# Patient Record
Sex: MALE | Race: WHITE | Employment: OTHER | URBAN - METROPOLITAN AREA
[De-identification: names, ages, dates, MRNs, and addresses within clinical notes are randomized per-mention and may not be internally consistent; named-entity substitution may affect disease eponyms.]

---

## 2017-09-25 ENCOUNTER — ANESTHESIA (OUTPATIENT)
Dept: ENDOSCOPY | Age: 77
End: 2017-09-25
Payer: MEDICARE

## 2017-09-25 ENCOUNTER — HOSPITAL ENCOUNTER (OUTPATIENT)
Age: 77
Setting detail: OUTPATIENT SURGERY
Discharge: HOME | End: 2017-09-25
Attending: SURGERY | Admitting: SURGERY
Payer: MEDICARE

## 2017-09-25 ENCOUNTER — ANESTHESIA EVENT (OUTPATIENT)
Dept: ENDOSCOPY | Age: 77
End: 2017-09-25
Payer: MEDICARE

## 2017-09-25 VITALS
HEART RATE: 68 BPM | WEIGHT: 165 LBS | OXYGEN SATURATION: 97 % | RESPIRATION RATE: 12 BRPM | DIASTOLIC BLOOD PRESSURE: 68 MMHG | BODY MASS INDEX: 25.01 KG/M2 | HEIGHT: 68 IN | SYSTOLIC BLOOD PRESSURE: 117 MMHG | TEMPERATURE: 98.6 F

## 2017-09-25 LAB — GLUCOSE BLD STRIP.AUTO-MCNC: 128 MG/DL (ref 65–100)

## 2017-09-25 PROCEDURE — 77030013991 HC SNR POLYP ENDOSC BSC -A: Performed by: SURGERY

## 2017-09-25 PROCEDURE — 74011250636 HC RX REV CODE- 250/636

## 2017-09-25 PROCEDURE — 74011000250 HC RX REV CODE- 250

## 2017-09-25 PROCEDURE — 88305 TISSUE EXAM BY PATHOLOGIST: CPT | Performed by: SURGERY

## 2017-09-25 PROCEDURE — 76040000025: Performed by: SURGERY

## 2017-09-25 PROCEDURE — 74011250636 HC RX REV CODE- 250/636: Performed by: ANESTHESIOLOGY

## 2017-09-25 PROCEDURE — 82962 GLUCOSE BLOOD TEST: CPT

## 2017-09-25 PROCEDURE — 77030011640 HC PAD GRND REM COVD -A: Performed by: SURGERY

## 2017-09-25 PROCEDURE — 76060000032 HC ANESTHESIA 0.5 TO 1 HR: Performed by: SURGERY

## 2017-09-25 RX ORDER — PROPOFOL 10 MG/ML
INJECTION, EMULSION INTRAVENOUS AS NEEDED
Status: DISCONTINUED | OUTPATIENT
Start: 2017-09-25 | End: 2017-09-25 | Stop reason: HOSPADM

## 2017-09-25 RX ORDER — SODIUM CHLORIDE, SODIUM LACTATE, POTASSIUM CHLORIDE, CALCIUM CHLORIDE 600; 310; 30; 20 MG/100ML; MG/100ML; MG/100ML; MG/100ML
100 INJECTION, SOLUTION INTRAVENOUS CONTINUOUS
Status: DISCONTINUED | OUTPATIENT
Start: 2017-09-25 | End: 2017-09-25 | Stop reason: HOSPADM

## 2017-09-25 RX ORDER — SODIUM CHLORIDE, SODIUM LACTATE, POTASSIUM CHLORIDE, CALCIUM CHLORIDE 600; 310; 30; 20 MG/100ML; MG/100ML; MG/100ML; MG/100ML
100 INJECTION, SOLUTION INTRAVENOUS CONTINUOUS
Status: CANCELLED | OUTPATIENT
Start: 2017-09-25

## 2017-09-25 RX ORDER — LIDOCAINE HYDROCHLORIDE 20 MG/ML
INJECTION, SOLUTION EPIDURAL; INFILTRATION; INTRACAUDAL; PERINEURAL AS NEEDED
Status: DISCONTINUED | OUTPATIENT
Start: 2017-09-25 | End: 2017-09-25 | Stop reason: HOSPADM

## 2017-09-25 RX ORDER — PROPOFOL 10 MG/ML
INJECTION, EMULSION INTRAVENOUS
Status: DISCONTINUED | OUTPATIENT
Start: 2017-09-25 | End: 2017-09-25 | Stop reason: HOSPADM

## 2017-09-25 RX ORDER — SODIUM CHLORIDE 0.9 % (FLUSH) 0.9 %
5-10 SYRINGE (ML) INJECTION AS NEEDED
Status: CANCELLED | OUTPATIENT
Start: 2017-09-25

## 2017-09-25 RX ADMIN — PROPOFOL 140 MCG/KG/MIN: 10 INJECTION, EMULSION INTRAVENOUS at 11:45

## 2017-09-25 RX ADMIN — PROPOFOL 40 MG: 10 INJECTION, EMULSION INTRAVENOUS at 11:45

## 2017-09-25 RX ADMIN — PROPOFOL 20 MG: 10 INJECTION, EMULSION INTRAVENOUS at 11:56

## 2017-09-25 RX ADMIN — SODIUM CHLORIDE, SODIUM LACTATE, POTASSIUM CHLORIDE, AND CALCIUM CHLORIDE 100 ML/HR: 600; 310; 30; 20 INJECTION, SOLUTION INTRAVENOUS at 11:05

## 2017-09-25 RX ADMIN — LIDOCAINE HYDROCHLORIDE 50 MG: 20 INJECTION, SOLUTION EPIDURAL; INFILTRATION; INTRACAUDAL; PERINEURAL at 11:45

## 2017-09-25 RX ADMIN — LIDOCAINE HYDROCHLORIDE 10 MG: 20 INJECTION, SOLUTION EPIDURAL; INFILTRATION; INTRACAUDAL; PERINEURAL at 11:47

## 2017-09-25 NOTE — ANESTHESIA PREPROCEDURE EVALUATION
Anesthetic History               Review of Systems / Medical History  Patient summary reviewed, nursing notes reviewed and pertinent labs reviewed    Pulmonary        Sleep apnea: No treatment           Neuro/Psych              Cardiovascular                  Exercise tolerance: >4 METS     GI/Hepatic/Renal         Renal disease: CRI       Endo/Other    Diabetes: well controlled, type 2    Arthritis     Other Findings            Physical Exam    Airway  Mallampati: II  TM Distance: > 6 cm  Neck ROM: decreased range of motion   Mouth opening: Normal     Cardiovascular  Regular rate and rhythm,  S1 and S2 normal,  no murmur, click, rub, or gallop             Dental  No notable dental hx       Pulmonary  Breath sounds clear to auscultation               Abdominal  GI exam deferred       Other Findings            Anesthetic Plan    ASA: 3  Anesthesia type: total IV anesthesia            Anesthetic plan and risks discussed with: Patient

## 2017-09-25 NOTE — PROGRESS NOTES
Discharge instructions and discharge med list given to pt's friend Navi Daniels, voiced understanding.

## 2017-09-25 NOTE — ANESTHESIA POSTPROCEDURE EVALUATION
Post-Anesthesia Evaluation and Assessment    Patient: Elaine Gonzalez MRN: [id number]  SSN: [id number]    YOB: 1940  Age: 64 y.o. Sex: male       Cardiovascular Function/Vital Signs  Visit Vitals    /57    Pulse (!) 51    Temp 37 °C (98.6 °F)    Resp 30    Ht 5' 8\" (1.727 m)    Wt 74.8 kg (165 lb)    SpO2 95%    BMI 25.09 kg/m2       Patient is status post total IV anesthesia anesthesia for Procedure(s):  COLONOSCOPY / BMI 25  ENDOSCOPIC POLYPECTOMY. Nausea/Vomiting: None    Postoperative hydration reviewed and adequate. Pain:  Pain Scale 1: Numeric (0 - 10) (09/25/17 1221)  Pain Intensity 1: 0 (09/25/17 1221)   Managed    Neurological Status: At baseline    Mental Status and Level of Consciousness: Arousable    Pulmonary Status:   O2 Device: Nasal cannula (09/25/17 1221)   Adequate oxygenation and airway patent    Complications related to anesthesia: None    Post-anesthesia assessment completed.  No concerns    Signed By: Aliza Harper MD     September 25, 2017

## 2017-09-25 NOTE — PROCEDURES
Procedure in Detail:  Informed consent was obtained for the procedure. The patient was placed in the left lateral decubitus position and sedation was induced by anesthesia. The [id number] was inserted into the rectum and advanced under direct vision to the cecum, which was identified by the ileocecal valve and appendiceal orifice. The quality of the colonic preparation was good. A careful inspection was made as the colonoscope was withdrawn, including a retroflexed view of the rectum; findings and interventions are described below. Appropriate photodocumentation was obtained. Findings:   Rectum:     - Protruding lesions:     -Internal Hemorrhoids  Sigmoid:     - Excavated lesions:     - Diverticulosis  Descending Colon:     - Excavated lesions:     - Diverticulosis  Transverse Colon:     - Excavated lesions:     - Diverticulosis  Ascending Colon:     - Excavated lesions:     - Diverticulosis  Cecum:     - Protruding lesions:     -Sessile Polyp(s) size 10 mm removed by polypectomy (snare cautery)            Specimens: Specimens were collected and sent to pathology. Complications: None; patient tolerated the procedure well. \    EBL - none    Recommendations:   - Await pathology.      - no further surveillance based on age     Signed By: Teetee Anderson MD                        September 25, 2017

## 2017-09-25 NOTE — H&P
Colonoscopy History and Physical      Patient: Chriss Ignacia    Physician: Osbaldo Ohara MD    Referring Physician: Ange Dunn MD    Chief Complaint: For colonoscopy    History of Present Illness: Pt presents for colonoscopy. Has had at least 2 prior exams Nathalia Lopes); believes he had a benign polyp on last exam which is at least 8 years ago. History:  Past Medical History:   Diagnosis Date    Arthritis     hx of Reiters syndrome    Diabetes ([address] type 2 since 2001    oral agents.  bs: 110-130. hypo at 90    Elevated PSA     Generalized anxiety disorder     Gross hematuria     Impacted cerumen     Impotence of organic origin     Other malaise and fatigue 7/9/2015    Pain in joint, ankle and foot     Primary hypercoagulable state ([address]     Psychiatric disorder     minimal anxiety and depression     Pure hypercholesterolemia 7/9/2015    Shingles     Sleep apnea     just DX - no C PAP yet     Straining on urination      Past Surgical History:   Procedure Laterality Date    HX CATARACT REMOVAL  2013    bilateral with iol    HX CATARACT REMOVAL      HX HERNIA REPAIR  bilateral    HX OTHER SURGICAL Left 1980's    shoulder    HX OTHER SURGICAL Right 1986    knee    HX RETINAL DETACHMENT REPAIR Left       Social History     Social History    Marital status:      Spouse name: N/A    Number of children: N/A    Years of education: N/A     Social History Main Topics    Smoking status: Former Smoker     Packs/day: 2.00     Years: 20.00    Smokeless tobacco: Never Used      Comment: quit 1976    Alcohol use 3.6 oz/week     6 Glasses of wine per week    Drug use: No    Sexual activity: Not Asked     Other Topics Concern    None     Social History Narrative      Family History   Problem Relation Age of Onset    Cancer Mother     Dementia Mother     Psychiatric Disorder Mother     Cancer Sister      breast    Hypertension Father        Medications:   Prior to Admission medications    Medication Sig Start Date End Date Taking? Authorizing Provider   multivitamin (ONE A DAY) tablet Take 1 Tab by mouth daily. Stop seven days prior to surgery per anesthesia protocol. Yes Historical Provider   metFORMIN (GLUCOPHAGE) 1,000 mg tablet TAKE 1 TAB BY MOUTH TWO (2) TIMES DAILY (WITH MEALS). 8/31/17  Yes Amanda Mcelroy MD   lisinopril (PRINIVIL, ZESTRIL) 2.5 mg tablet Take 1 Tab by mouth daily. 8/23/17  Yes Amanda Mcelroy MD   simvastatin (ZOCOR) 40 mg tablet Take 1 Tab by mouth nightly. Patient taking differently: Take 40 mg by mouth nightly. Indications: hypercholesterolemia 6/30/17  Yes Amanda Mcelroy MD   finasteride (PROSCAR) 5 mg tablet TAKE 1 TAB BY MOUTH DAILY. Patient taking differently: TAKE 1 TAB BY MOUTH DAILY. QHS 1/9/17  Yes Yuan Messina MD   clonazePAM (KLONOPIN) 1 mg tablet Take 1 Tab by mouth daily. Max Daily Amount: 1 mg. Patient taking differently: Take 1 mg by mouth as needed. Take day of surgery per anesthesia protocol. 8/23/17   Amanda Mcelroy MD   glipiZIDE SR (GLUCOTROL XL) 5 mg CR tablet Take 1 Tab by mouth daily. 6/30/17   Amanda Mcelroy MD       Allergies: Allergies   Allergen Reactions    Penicillin G Rash     As a child       Physical Exam:     Vital Signs:   Visit Vitals    /66    Pulse 83    Temp 97.5 °F (36.4 °C)    Resp 16    Ht 5' 8\" (1.727 m)    Wt 165 lb (74.8 kg)    SpO2 98%    BMI 25.09 kg/m2     . General: in NAD      Heart: regular   Lungs: unlabored   Abdominal: soft   Neurological: grossly normal        Findings/Diagnosis: Screening for colorectal cancer     Plan of Care/Planned Procedure: Colonoscopy. Risks of endoscopy include  bleeding, perforation. They understand and agree to proceed.       Signed:  Alejandro Castle MD   9/25/2017

## 2017-09-25 NOTE — IP AVS SNAPSHOT
Shante Gabbs 
 
 
 [facility] [address] [facility] 
[phone] Patient: Alejandra Barajas MRN: [id number] OCV:2/79/6865 You are allergic to the following Allergen Reactions Penicillin G Rash As a child Recent Documentation Height Weight BMI Smoking Status 1.727 m 74.8 kg 25.09 kg/m2 Former Smoker Emergency Contacts Name Discharge Info Relation Home Work Mobile Michael Early [[phone] Lino Kunz  Son [22] [phone] About your hospitalization You were admitted on:  September 25, 2017 You last received care in the:  [facility] You were discharged on:  September 25, 2017 Unit phone number:  [phone] Why you were hospitalized Your primary diagnosis was:  Not on File Providers Seen During Your Hospitalizations Provider Role Specialty Primary office phone Jasper Ferrera MD Attending Provider General Surgery [phone] Your Primary Care Physician (PCP) Primary Care Physician Office Phone Office Fax Via [address] [phone] Follow-up Information None Current Discharge Medication List  
  
ASK your doctor about these medications Dose & Instructions Dispensing Information Comments Morning Noon Evening Bedtime  
 clonazePAM 1 mg tablet Commonly known as:  Howie Gibjon Your last dose was: Your next dose is:    
   
   
 Dose:  1 mg Take 1 Tab by mouth daily. Max Daily Amount: 1 mg. Quantity:  90 Tab Refills:  1  
     
   
   
   
  
 finasteride 5 mg tablet Commonly known as:  PROSCAR Your last dose was: Your next dose is: TAKE 1 TAB BY MOUTH DAILY. Quantity:  90 Tab Refills:  3  
     
   
   
   
  
 glipiZIDE SR 5 mg CR tablet Commonly known as:  GLUCOTROL XL  
   
 Your last dose was: Your next dose is:    
   
   
 Dose:  5 mg Take 1 Tab by mouth daily. Quantity:  90 Tab Refills:  1  
     
   
   
   
  
 lisinopril 2.5 mg tablet Commonly known as:  Pecola Cypher Your last dose was: Your next dose is:    
   
   
 Dose:  2.5 mg Take 1 Tab by mouth daily. Quantity:  90 Tab Refills:  1  
     
   
   
   
  
 metFORMIN 1,000 mg tablet Commonly known as:  GLUCOPHAGE Your last dose was: Your next dose is: TAKE 1 TAB BY MOUTH TWO (2) TIMES DAILY (WITH MEALS). Quantity:  180 Tab Refills:  1  
     
   
   
   
  
 multivitamin tablet Commonly known as:  ONE A DAY Your last dose was: Your next dose is:    
   
   
 Dose:  1 Tab Take 1 Tab by mouth daily. Stop seven days prior to surgery per anesthesia protocol. Refills:  0  
     
   
   
   
  
 simvastatin 40 mg tablet Commonly known as:  ZOCOR Your last dose was: Your next dose is:    
   
   
 Dose:  40 mg Take 1 Tab by mouth nightly. Quantity:  90 Tab Refills:  1 Discharge Instructions Gastrointestinal Colonoscopy/Flexible Sigmoidoscopy - Lower Exam Discharge Instructions 1. Call Dr. Dusty Torres at [phone] for any problems or questions. 2. Contact the doctors office for follow up appointment as directed 3. Medication may cause drowsiness for several hours, therefore, do not drive or operate machinery for remainder of the day. 4. No alcohol today. 5. Ordinarily, you may resume regular diet and activity after exam unless otherwise specified by your physician. 6. Because of air put into your colon during exam, you may experience some abdominal distension, relieved by the passage of gas, for several hours. 7. Contact your physician if you have any of the following: 
a.  Excessive amount of bleeding  large amount when having a bowel movement. Occasional specks of blood with bowel movement would not be unusual. 
b. Severe abdominal pain 
c. Fever or Chills 8. Polyp Removal  follow these additional instructions 
a. Resume regular diet  
b. Take Metamucil  1 tablespoon in juice every morning for 3 days 
c. No Aspirin, Advil, Aleve, Nuprin, Ibuprofen, or medications that contain these drugs for 2 weeks. Any additional instructions:   
 
Await pathology. Instructions given to Umair Womack and other family members. Instructions given by:  Candi Kessler RN Discharge Orders None ACO Transitions of Care Introducing [employer] [address] offers a voluntary care coordination program to provide high quality service and care to [facility] fee-for-service beneficiaries. [place] was designed to help you enhance your health and well-being through the following services: ? Transitions of Care  support for individuals who are transitioning from one care setting to another (example: Hospital to home). ? Chronic and Complex Care Coordination  support for individuals and caregivers of those with serious or chronic illnesses or with more than one chronic (ongoing) condition and those who take a number of different medications. If you meet specific medical criteria, a [address] [place] [address] Rd may call you directly to coordinate your care with your primary care physician and your other care providers. For questions about the [facility] programs, please, contact your physicians office. For general questions or additional information about Accountable Care Organizations: 
Please visit www.medicare.gov/acos. html or call 1-800-MEDICARE (9-950.804.7923) TTY users should call 8-437.945.8798. Introducing [facility]!    
 Martina Salazar introduces [employer] patient portal. Now you can access parts of your medical record, email your doctor's office, and request medication refills online. 1. In your internet browser, go to [email or web address] 2. Click on the First Time User? Click Here link in the Sign In box. You will see the New Member Sign Up page. 3. Enter your UpTap Access Code exactly as it appears below. You will not need to use this code after youve completed the sign-up process. If you do not sign up before the expiration date, you must request a new code. · UpTap Access Code: [email or web address] Expires: 11/21/2017 12:27 PM 
 
4. Enter the last four digits of your Social Security Number (xxxx) and Date of Birth (mm/dd/yyyy) as indicated and click Submit. You will be taken to the next sign-up page. 5. Create a UpTap ID. This will be your UpTap login ID and cannot be changed, so think of one that is secure and easy to remember. 6. Create a UpTap password. You can change your password at any time. 7. Enter your Password Reset Question and Answer. This can be used at a later time if you forget your password. 8. Enter your e-mail address. You will receive e-mail notification when new information is available in [address] 9. Click Sign Up. You can now view and download portions of your medical record. 10. Click the Download Summary menu link to download a portable copy of your medical information. If you have questions, please visit the Frequently Asked Questions section of the UpTap website. Remember, UpTap is NOT to be used for urgent needs. For medical emergencies, dial 911. Now available from your iPhone and Android! General Information Please provide this summary of care documentation to your next provider. Patient Signature:  ____________________________________________________________ Date:  ____________________________________________________________  
  
Shellie Spare  Provider Signature: ____________________________________________________________ Date:  ____________________________________________________________

## 2017-09-25 NOTE — DISCHARGE INSTRUCTIONS
Gastrointestinal Colonoscopy/Flexible Sigmoidoscopy - Lower Exam Discharge Instructions  1. Call Dr. Morena Nino at [phone] for any problems or questions. 2. Contact the doctors office for follow up appointment as directed  3. Medication may cause drowsiness for several hours, therefore, do not drive or operate machinery for remainder of the day. 4. No alcohol today. 5. Ordinarily, you may resume regular diet and activity after exam unless otherwise specified by your physician. 6. Because of air put into your colon during exam, you may experience some abdominal distension, relieved by the passage of gas, for several hours. 7. Contact your physician if you have any of the following:  a. Excessive amount of bleeding - large amount when having a bowel movement. Occasional specks of blood with bowel movement would not be unusual.  b. Severe abdominal pain  c. Fever or Chills  8. Polyp Removal - follow these additional instructions  a. Resume regular diet   b. Take Metamucil - 1 tablespoon in juice every morning for 3 days  c. No Aspirin, Advil, Aleve, Nuprin, Ibuprofen, or medications that contain these drugs for 2 weeks. Any additional instructions:      Await pathology. Instructions given to Christine Pearl and other family members.   Instructions given by:  Aubrey Cortes RN

## 2017-10-26 PROBLEM — G47.34 NOCTURNAL HYPOXEMIA: Status: ACTIVE | Noted: 2017-10-26

## 2017-10-26 PROBLEM — G47.33 OSA (OBSTRUCTIVE SLEEP APNEA): Status: ACTIVE | Noted: 2017-10-26

## 2018-03-16 ENCOUNTER — HOSPITAL ENCOUNTER (OUTPATIENT)
Dept: GENERAL RADIOLOGY | Age: 78
Discharge: HOME | End: 2018-03-16
Attending: FAMILY MEDICINE
Payer: MEDICARE

## 2018-03-16 DIAGNOSIS — R05.9 COUGH: ICD-10-CM

## 2018-03-16 PROBLEM — E11.21 TYPE 2 DIABETES WITH NEPHROPATHY (HCC): Status: ACTIVE | Noted: 2018-03-16

## 2018-03-16 PROCEDURE — 71046 X-RAY EXAM CHEST 2 VIEWS: CPT

## 2018-03-16 NOTE — PROGRESS NOTES
CXR negative great news! Will have blood test back Monday but continue on omnicef, medrol dose pack and all supportive care.   AW

## 2021-01-01 ENCOUNTER — APPOINTMENT (OUTPATIENT)
Dept: CT IMAGING | Age: 81
DRG: 177 | End: 2021-01-01
Attending: INTERNAL MEDICINE
Payer: MEDICARE

## 2021-01-01 ENCOUNTER — HOSPITAL ENCOUNTER (EMERGENCY)
Age: 81
Discharge: ANOTHER HOSPITAL | DRG: 177 | End: 2021-01-06
Attending: STUDENT IN AN ORGANIZED HEALTH CARE EDUCATION/TRAINING PROGRAM | Admitting: STUDENT IN AN ORGANIZED HEALTH CARE EDUCATION/TRAINING PROGRAM
Payer: MEDICARE

## 2021-01-01 ENCOUNTER — APPOINTMENT (OUTPATIENT)
Dept: GENERAL RADIOLOGY | Age: 81
DRG: 177 | End: 2021-01-01
Attending: STUDENT IN AN ORGANIZED HEALTH CARE EDUCATION/TRAINING PROGRAM
Payer: MEDICARE

## 2021-01-01 ENCOUNTER — APPOINTMENT (OUTPATIENT)
Dept: GENERAL RADIOLOGY | Age: 81
DRG: 177 | End: 2021-01-01
Attending: INTERNAL MEDICINE
Payer: MEDICARE

## 2021-01-01 ENCOUNTER — HOSPITAL ENCOUNTER (INPATIENT)
Age: 81
LOS: 5 days | DRG: 177 | End: 2021-01-11
Attending: INTERNAL MEDICINE | Admitting: INTERNAL MEDICINE
Payer: MEDICARE

## 2021-01-01 VITALS
RESPIRATION RATE: 20 BRPM | BODY MASS INDEX: 23.73 KG/M2 | SYSTOLIC BLOOD PRESSURE: 135 MMHG | DIASTOLIC BLOOD PRESSURE: 61 MMHG | OXYGEN SATURATION: 91 % | WEIGHT: 156.1 LBS | HEART RATE: 109 BPM | TEMPERATURE: 99 F

## 2021-01-01 VITALS
OXYGEN SATURATION: 96 % | HEART RATE: 99 BPM | SYSTOLIC BLOOD PRESSURE: 139 MMHG | RESPIRATION RATE: 20 BRPM | DIASTOLIC BLOOD PRESSURE: 68 MMHG | TEMPERATURE: 98.2 F

## 2021-01-01 DIAGNOSIS — Z20.822 SUSPECTED COVID-19 VIRUS INFECTION: ICD-10-CM

## 2021-01-01 DIAGNOSIS — E11.21 TYPE 2 DIABETES WITH NEPHROPATHY (HCC): ICD-10-CM

## 2021-01-01 DIAGNOSIS — G47.33 OSA (OBSTRUCTIVE SLEEP APNEA): ICD-10-CM

## 2021-01-01 DIAGNOSIS — U07.1 PNEUMONIA DUE TO COVID-19 VIRUS: ICD-10-CM

## 2021-01-01 DIAGNOSIS — R09.02 HYPOXIA: Primary | ICD-10-CM

## 2021-01-01 DIAGNOSIS — J96.01 ACUTE RESPIRATORY FAILURE WITH HYPOXIA (HCC): ICD-10-CM

## 2021-01-01 DIAGNOSIS — J12.82 PNEUMONIA DUE TO COVID-19 VIRUS: ICD-10-CM

## 2021-01-01 LAB
ABO + RH BLD: NORMAL
ALBUMIN SERPL-MCNC: 2.9 G/DL (ref 3.2–4.6)
ALBUMIN SERPL-MCNC: 3 G/DL (ref 3.2–4.6)
ALBUMIN SERPL-MCNC: 3 G/DL (ref 3.2–4.6)
ALBUMIN/GLOB SERPL: 0.7 {RATIO} (ref 1.2–3.5)
ALBUMIN/GLOB SERPL: 0.7 {RATIO} (ref 1.2–3.5)
ALBUMIN/GLOB SERPL: 0.8 {RATIO} (ref 1.2–3.5)
ALP SERPL-CCNC: 57 U/L (ref 50–136)
ALP SERPL-CCNC: 60 U/L (ref 50–136)
ALP SERPL-CCNC: 72 U/L (ref 50–136)
ALT SERPL-CCNC: 108 U/L (ref 12–65)
ALT SERPL-CCNC: 41 U/L (ref 12–65)
ALT SERPL-CCNC: 46 U/L (ref 12–65)
ANION GAP SERPL CALC-SCNC: 10 MMOL/L (ref 7–16)
ANION GAP SERPL CALC-SCNC: 10 MMOL/L (ref 7–16)
ANION GAP SERPL CALC-SCNC: 8 MMOL/L (ref 7–16)
ANION GAP SERPL CALC-SCNC: 9 MMOL/L (ref 7–16)
ARTERIAL PATENCY WRIST A: YES
AST SERPL-CCNC: 35 U/L (ref 15–37)
AST SERPL-CCNC: 39 U/L (ref 15–37)
AST SERPL-CCNC: 91 U/L (ref 15–37)
ATRIAL RATE: 95 BPM
BASE DEFICIT BLD-SCNC: 4 MMOL/L
BASOPHILS # BLD: 0 K/UL (ref 0–0.2)
BASOPHILS NFR BLD: 0 % (ref 0–2)
BDY SITE: ABNORMAL
BILIRUB SERPL-MCNC: 0.6 MG/DL (ref 0.2–1.1)
BILIRUB SERPL-MCNC: 0.6 MG/DL (ref 0.2–1.1)
BILIRUB SERPL-MCNC: 1 MG/DL (ref 0.2–1.1)
BLD PROD TYP BPU: NORMAL
BLOOD BANK CMNT PATIENT-IMP: NORMAL
BPU ID: NORMAL
BUN SERPL-MCNC: 31 MG/DL (ref 8–23)
BUN SERPL-MCNC: 34 MG/DL (ref 8–23)
BUN SERPL-MCNC: 34 MG/DL (ref 8–23)
BUN SERPL-MCNC: 42 MG/DL (ref 8–23)
CALCIUM SERPL-MCNC: 8.8 MG/DL (ref 8.3–10.4)
CALCIUM SERPL-MCNC: 8.9 MG/DL (ref 8.3–10.4)
CALCIUM SERPL-MCNC: 8.9 MG/DL (ref 8.3–10.4)
CALCIUM SERPL-MCNC: 9.1 MG/DL (ref 8.3–10.4)
CALCULATED P AXIS, ECG09: 59 DEGREES
CALCULATED R AXIS, ECG10: 4 DEGREES
CALCULATED T AXIS, ECG11: 27 DEGREES
CHLORIDE SERPL-SCNC: 100 MMOL/L (ref 98–107)
CHLORIDE SERPL-SCNC: 101 MMOL/L (ref 98–107)
CHLORIDE SERPL-SCNC: 107 MMOL/L (ref 98–107)
CHLORIDE SERPL-SCNC: 99 MMOL/L (ref 98–107)
CO2 BLD-SCNC: 19 MMOL/L
CO2 SERPL-SCNC: 20 MMOL/L (ref 21–32)
CO2 SERPL-SCNC: 23 MMOL/L (ref 21–32)
CO2 SERPL-SCNC: 23 MMOL/L (ref 21–32)
CO2 SERPL-SCNC: 25 MMOL/L (ref 21–32)
COLLECT TIME,HTIME: 1040
COVID-19 RAPID TEST, COVR: DETECTED
CREAT SERPL-MCNC: 1.11 MG/DL (ref 0.8–1.5)
CREAT SERPL-MCNC: 1.15 MG/DL (ref 0.8–1.5)
CREAT SERPL-MCNC: 1.28 MG/DL (ref 0.8–1.5)
CREAT SERPL-MCNC: 1.3 MG/DL (ref 0.8–1.5)
D DIMER PPP FEU-MCNC: 3.19 UG/ML(FEU)
DIAGNOSIS, 93000: NORMAL
DIFFERENTIAL METHOD BLD: ABNORMAL
EOSINOPHIL # BLD: 0 K/UL (ref 0–0.8)
EOSINOPHIL # BLD: 0.3 K/UL (ref 0–0.8)
EOSINOPHIL NFR BLD: 0 % (ref 0.5–7.8)
EOSINOPHIL NFR BLD: 3 % (ref 0.5–7.8)
ERYTHROCYTE [DISTWIDTH] IN BLOOD BY AUTOMATED COUNT: 13 % (ref 11.9–14.6)
ERYTHROCYTE [DISTWIDTH] IN BLOOD BY AUTOMATED COUNT: 13.1 % (ref 11.9–14.6)
ERYTHROCYTE [DISTWIDTH] IN BLOOD BY AUTOMATED COUNT: 13.2 % (ref 11.9–14.6)
ERYTHROCYTE [DISTWIDTH] IN BLOOD BY AUTOMATED COUNT: 13.2 % (ref 11.9–14.6)
FLOW RATE ISTAT,IFRATE: 55 L/MIN
GAS FLOW.O2 O2 DELIVERY SYS: ABNORMAL L/MIN
GLOBULIN SER CALC-MCNC: 4 G/DL (ref 2.3–3.5)
GLOBULIN SER CALC-MCNC: 4 G/DL (ref 2.3–3.5)
GLOBULIN SER CALC-MCNC: 4.2 G/DL (ref 2.3–3.5)
GLUCOSE BLD STRIP.AUTO-MCNC: 171 MG/DL (ref 65–100)
GLUCOSE BLD STRIP.AUTO-MCNC: 173 MG/DL (ref 65–100)
GLUCOSE BLD STRIP.AUTO-MCNC: 192 MG/DL (ref 65–100)
GLUCOSE BLD STRIP.AUTO-MCNC: 200 MG/DL (ref 65–100)
GLUCOSE BLD STRIP.AUTO-MCNC: 201 MG/DL (ref 65–100)
GLUCOSE BLD STRIP.AUTO-MCNC: 218 MG/DL (ref 65–100)
GLUCOSE BLD STRIP.AUTO-MCNC: 231 MG/DL (ref 65–100)
GLUCOSE BLD STRIP.AUTO-MCNC: 232 MG/DL (ref 65–100)
GLUCOSE BLD STRIP.AUTO-MCNC: 249 MG/DL (ref 65–100)
GLUCOSE BLD STRIP.AUTO-MCNC: 249 MG/DL (ref 65–100)
GLUCOSE BLD STRIP.AUTO-MCNC: 268 MG/DL (ref 65–100)
GLUCOSE BLD STRIP.AUTO-MCNC: 281 MG/DL (ref 65–100)
GLUCOSE BLD STRIP.AUTO-MCNC: 283 MG/DL (ref 65–100)
GLUCOSE BLD STRIP.AUTO-MCNC: 294 MG/DL (ref 65–100)
GLUCOSE BLD STRIP.AUTO-MCNC: 302 MG/DL (ref 65–100)
GLUCOSE BLD STRIP.AUTO-MCNC: 308 MG/DL (ref 65–100)
GLUCOSE BLD STRIP.AUTO-MCNC: 332 MG/DL (ref 65–100)
GLUCOSE BLD STRIP.AUTO-MCNC: 369 MG/DL (ref 65–100)
GLUCOSE SERPL-MCNC: 162 MG/DL (ref 65–100)
GLUCOSE SERPL-MCNC: 170 MG/DL (ref 65–100)
GLUCOSE SERPL-MCNC: 257 MG/DL (ref 65–100)
GLUCOSE SERPL-MCNC: 263 MG/DL (ref 65–100)
HCO3 BLD-SCNC: 18.7 MMOL/L (ref 22–26)
HCT VFR BLD AUTO: 36.4 % (ref 41.1–50.3)
HCT VFR BLD AUTO: 38.9 % (ref 41.1–50.3)
HCT VFR BLD AUTO: 40.9 % (ref 41.1–50.3)
HCT VFR BLD AUTO: 43.3 % (ref 41.1–50.3)
HGB BLD-MCNC: 12.6 G/DL (ref 13.6–17.2)
HGB BLD-MCNC: 13.3 G/DL (ref 13.6–17.2)
HGB BLD-MCNC: 13.9 G/DL (ref 13.6–17.2)
HGB BLD-MCNC: 14.7 G/DL (ref 13.6–17.2)
IMM GRANULOCYTES # BLD AUTO: 0 K/UL (ref 0–0.5)
IMM GRANULOCYTES # BLD AUTO: 0.1 K/UL (ref 0–0.5)
IMM GRANULOCYTES NFR BLD AUTO: 0 % (ref 0–5)
IMM GRANULOCYTES NFR BLD AUTO: 1 % (ref 0–5)
INR PPP: 1.1
LDH SERPL L TO P-CCNC: 250 U/L (ref 110–210)
LYMPHOCYTES # BLD: 0.4 K/UL (ref 0.5–4.6)
LYMPHOCYTES # BLD: 0.5 K/UL (ref 0.5–4.6)
LYMPHOCYTES # BLD: 0.5 K/UL (ref 0.5–4.6)
LYMPHOCYTES # BLD: 0.8 K/UL (ref 0.5–4.6)
LYMPHOCYTES NFR BLD: 4 % (ref 13–44)
LYMPHOCYTES NFR BLD: 5 % (ref 13–44)
LYMPHOCYTES NFR BLD: 6 % (ref 13–44)
LYMPHOCYTES NFR BLD: 8 % (ref 13–44)
MCH RBC QN AUTO: 31.1 PG (ref 26.1–32.9)
MCH RBC QN AUTO: 31.2 PG (ref 26.1–32.9)
MCH RBC QN AUTO: 31.4 PG (ref 26.1–32.9)
MCH RBC QN AUTO: 31.5 PG (ref 26.1–32.9)
MCHC RBC AUTO-ENTMCNC: 33.9 G/DL (ref 31.4–35)
MCHC RBC AUTO-ENTMCNC: 34 G/DL (ref 31.4–35)
MCHC RBC AUTO-ENTMCNC: 34.2 G/DL (ref 31.4–35)
MCHC RBC AUTO-ENTMCNC: 34.6 G/DL (ref 31.4–35)
MCV RBC AUTO: 90.9 FL (ref 79.6–97.8)
MCV RBC AUTO: 91 FL (ref 79.6–97.8)
MCV RBC AUTO: 91.9 FL (ref 79.6–97.8)
MCV RBC AUTO: 92.3 FL (ref 79.6–97.8)
MM INDURATION POC: 0 MM (ref 0–5)
MONOCYTES # BLD: 0.8 K/UL (ref 0.1–1.3)
MONOCYTES # BLD: 0.9 K/UL (ref 0.1–1.3)
MONOCYTES NFR BLD: 10 % (ref 4–12)
MONOCYTES NFR BLD: 9 % (ref 4–12)
NEUTS SEG # BLD: 7.6 K/UL (ref 1.7–8.2)
NEUTS SEG # BLD: 7.7 K/UL (ref 1.7–8.2)
NEUTS SEG # BLD: 7.9 K/UL (ref 1.7–8.2)
NEUTS SEG # BLD: 8 K/UL (ref 1.7–8.2)
NEUTS SEG NFR BLD: 81 % (ref 43–78)
NEUTS SEG NFR BLD: 84 % (ref 43–78)
NEUTS SEG NFR BLD: 84 % (ref 43–78)
NEUTS SEG NFR BLD: 85 % (ref 43–78)
NRBC # BLD: 0 K/UL (ref 0–0.2)
O2/TOTAL GAS SETTING VFR VENT: 100 %
P-R INTERVAL, ECG05: 186 MS
PCO2 BLD: 25.6 MMHG (ref 35–45)
PH BLD: 7.47 [PH] (ref 7.35–7.45)
PLATELET # BLD AUTO: 210 K/UL (ref 150–450)
PLATELET # BLD AUTO: 234 K/UL (ref 150–450)
PLATELET # BLD AUTO: 244 K/UL (ref 150–450)
PLATELET # BLD AUTO: 258 K/UL (ref 150–450)
PLATELET COMMENTS,PCOM: ABNORMAL
PMV BLD AUTO: 10 FL (ref 9.4–12.3)
PMV BLD AUTO: 10 FL (ref 9.4–12.3)
PMV BLD AUTO: 9.2 FL (ref 9.4–12.3)
PMV BLD AUTO: 9.6 FL (ref 9.4–12.3)
PO2 BLD: 54 MMHG (ref 75–100)
POTASSIUM SERPL-SCNC: 3.7 MMOL/L (ref 3.5–5.1)
POTASSIUM SERPL-SCNC: 3.9 MMOL/L (ref 3.5–5.1)
POTASSIUM SERPL-SCNC: 4.3 MMOL/L (ref 3.5–5.1)
POTASSIUM SERPL-SCNC: 4.6 MMOL/L (ref 3.5–5.1)
PPD POC: NEGATIVE NEGATIVE
PROT SERPL-MCNC: 6.9 G/DL (ref 6.3–8.2)
PROT SERPL-MCNC: 7 G/DL (ref 6.3–8.2)
PROT SERPL-MCNC: 7.2 G/DL (ref 6.3–8.2)
PROTHROMBIN TIME: 14.3 SEC (ref 12.5–14.7)
Q-T INTERVAL, ECG07: 320 MS
QRS DURATION, ECG06: 84 MS
QTC CALCULATION (BEZET), ECG08: 402 MS
RBC # BLD AUTO: 4 M/UL (ref 4.23–5.6)
RBC # BLD AUTO: 4.28 M/UL (ref 4.23–5.6)
RBC # BLD AUTO: 4.43 M/UL (ref 4.23–5.6)
RBC # BLD AUTO: 4.71 M/UL (ref 4.23–5.6)
RBC MORPH BLD: ABNORMAL
SAO2 % BLD: 90 % (ref 95–98)
SERVICE CMNT-IMP: ABNORMAL
SODIUM SERPL-SCNC: 131 MMOL/L (ref 136–145)
SODIUM SERPL-SCNC: 133 MMOL/L (ref 136–145)
SODIUM SERPL-SCNC: 134 MMOL/L (ref 138–145)
SODIUM SERPL-SCNC: 137 MMOL/L (ref 136–145)
SOURCE, COVRS: ABNORMAL
SPECIMEN TYPE: ABNORMAL
STATUS OF UNIT,%ST: NORMAL
TROPONIN-HIGH SENSITIVITY: 5.4 PG/ML (ref 0–14)
UNIT DIVISION, %UDIV: 0
VENTRICULAR RATE, ECG03: 95 BPM
WBC # BLD AUTO: 9.1 K/UL (ref 4.3–11.1)
WBC # BLD AUTO: 9.3 K/UL (ref 4.3–11.1)
WBC # BLD AUTO: 9.4 K/UL (ref 4.3–11.1)
WBC # BLD AUTO: 9.5 K/UL (ref 4.3–11.1)
WBC MORPH BLD: ABNORMAL

## 2021-01-01 PROCEDURE — 74011000302 HC RX REV CODE- 302: Performed by: INTERNAL MEDICINE

## 2021-01-01 PROCEDURE — 82803 BLOOD GASES ANY COMBINATION: CPT

## 2021-01-01 PROCEDURE — 93005 ELECTROCARDIOGRAM TRACING: CPT | Performed by: STUDENT IN AN ORGANIZED HEALTH CARE EDUCATION/TRAINING PROGRAM

## 2021-01-01 PROCEDURE — 74011636637 HC RX REV CODE- 636/637: Performed by: INTERNAL MEDICINE

## 2021-01-01 PROCEDURE — 74011250636 HC RX REV CODE- 250/636: Performed by: INTERNAL MEDICINE

## 2021-01-01 PROCEDURE — 65270000029 HC RM PRIVATE

## 2021-01-01 PROCEDURE — 74011000258 HC RX REV CODE- 258: Performed by: INTERNAL MEDICINE

## 2021-01-01 PROCEDURE — 74011250637 HC RX REV CODE- 250/637: Performed by: INTERNAL MEDICINE

## 2021-01-01 PROCEDURE — 94762 N-INVAS EAR/PLS OXIMTRY CONT: CPT

## 2021-01-01 PROCEDURE — 84484 ASSAY OF TROPONIN QUANT: CPT

## 2021-01-01 PROCEDURE — 85379 FIBRIN DEGRADATION QUANT: CPT

## 2021-01-01 PROCEDURE — 36430 TRANSFUSION BLD/BLD COMPNT: CPT

## 2021-01-01 PROCEDURE — 77030040830 HC CATH URETH FOL MDII -A

## 2021-01-01 PROCEDURE — 74011000636 HC RX REV CODE- 636: Performed by: INTERNAL MEDICINE

## 2021-01-01 PROCEDURE — 74011636637 HC RX REV CODE- 636/637: Performed by: HOSPITALIST

## 2021-01-01 PROCEDURE — 82962 GLUCOSE BLOOD TEST: CPT

## 2021-01-01 PROCEDURE — 86580 TB INTRADERMAL TEST: CPT | Performed by: INTERNAL MEDICINE

## 2021-01-01 PROCEDURE — 2709999900 HC NON-CHARGEABLE SUPPLY

## 2021-01-01 PROCEDURE — 96374 THER/PROPH/DIAG INJ IV PUSH: CPT

## 2021-01-01 PROCEDURE — 99285 EMERGENCY DEPT VISIT HI MDM: CPT

## 2021-01-01 PROCEDURE — 97535 SELF CARE MNGMENT TRAINING: CPT

## 2021-01-01 PROCEDURE — 77030021668 HC NEB PREFIL KT VYRM -A

## 2021-01-01 PROCEDURE — 85610 PROTHROMBIN TIME: CPT

## 2021-01-01 PROCEDURE — 74011250636 HC RX REV CODE- 250/636: Performed by: NURSE PRACTITIONER

## 2021-01-01 PROCEDURE — 74011000250 HC RX REV CODE- 250: Performed by: INTERNAL MEDICINE

## 2021-01-01 PROCEDURE — 36600 WITHDRAWAL OF ARTERIAL BLOOD: CPT

## 2021-01-01 PROCEDURE — 36415 COLL VENOUS BLD VENIPUNCTURE: CPT

## 2021-01-01 PROCEDURE — 80048 BASIC METABOLIC PNL TOTAL CA: CPT

## 2021-01-01 PROCEDURE — XW033E5 INTRODUCTION OF REMDESIVIR ANTI-INFECTIVE INTO PERIPHERAL VEIN, PERCUTANEOUS APPROACH, NEW TECHNOLOGY GROUP 5: ICD-10-PCS | Performed by: INTERNAL MEDICINE

## 2021-01-01 PROCEDURE — 71045 X-RAY EXAM CHEST 1 VIEW: CPT

## 2021-01-01 PROCEDURE — 80053 COMPREHEN METABOLIC PANEL: CPT

## 2021-01-01 PROCEDURE — 83615 LACTATE (LD) (LDH) ENZYME: CPT

## 2021-01-01 PROCEDURE — 87635 SARS-COV-2 COVID-19 AMP PRB: CPT

## 2021-01-01 PROCEDURE — 99223 1ST HOSP IP/OBS HIGH 75: CPT | Performed by: INTERNAL MEDICINE

## 2021-01-01 PROCEDURE — 85025 COMPLETE CBC W/AUTO DIFF WBC: CPT

## 2021-01-01 PROCEDURE — 77030012793 HC CIRC VNTLTR FISP -B

## 2021-01-01 PROCEDURE — 97161 PT EVAL LOW COMPLEX 20 MIN: CPT

## 2021-01-01 PROCEDURE — 97110 THERAPEUTIC EXERCISES: CPT

## 2021-01-01 PROCEDURE — XW13325 TRANSFUSION OF CONVALESCENT PLASMA (NONAUTOLOGOUS) INTO PERIPHERAL VEIN, PERCUTANEOUS APPROACH, NEW TECHNOLOGY GROUP 5: ICD-10-PCS | Performed by: INTERNAL MEDICINE

## 2021-01-01 PROCEDURE — 71260 CT THORAX DX C+: CPT

## 2021-01-01 PROCEDURE — 77030012794 HC KT NSL CANN/HGH TRAN -A

## 2021-01-01 PROCEDURE — 97166 OT EVAL MOD COMPLEX 45 MIN: CPT

## 2021-01-01 PROCEDURE — 74011250636 HC RX REV CODE- 250/636: Performed by: STUDENT IN AN ORGANIZED HEALTH CARE EDUCATION/TRAINING PROGRAM

## 2021-01-01 PROCEDURE — 86901 BLOOD TYPING SEROLOGIC RH(D): CPT

## 2021-01-01 PROCEDURE — 74011250637 HC RX REV CODE- 250/637: Performed by: HOSPITALIST

## 2021-01-01 RX ORDER — QUETIAPINE FUMARATE 25 MG/1
25 TABLET, FILM COATED ORAL
Status: DISCONTINUED | OUTPATIENT
Start: 2021-01-01 | End: 2021-01-01

## 2021-01-01 RX ORDER — DEXAMETHASONE SODIUM PHOSPHATE 100 MG/10ML
10 INJECTION INTRAMUSCULAR; INTRAVENOUS
Status: COMPLETED | OUTPATIENT
Start: 2021-01-01 | End: 2021-01-01

## 2021-01-01 RX ORDER — LEVOTHYROXINE SODIUM 50 UG/1
50 TABLET ORAL
Status: DISCONTINUED | OUTPATIENT
Start: 2021-01-01 | End: 2021-01-01

## 2021-01-01 RX ORDER — SODIUM CHLORIDE 0.9 % (FLUSH) 0.9 %
5-40 SYRINGE (ML) INJECTION AS NEEDED
Status: DISCONTINUED | OUTPATIENT
Start: 2021-01-01 | End: 2021-01-01 | Stop reason: HOSPADM

## 2021-01-01 RX ORDER — DEXAMETHASONE 4 MG/1
6 TABLET ORAL DAILY
Status: DISCONTINUED | OUTPATIENT
Start: 2021-01-01 | End: 2021-01-01

## 2021-01-01 RX ORDER — UREA 10 %
220 LOTION (ML) TOPICAL DAILY
Status: DISCONTINUED | OUTPATIENT
Start: 2021-01-01 | End: 2021-01-01

## 2021-01-01 RX ORDER — ONDANSETRON 2 MG/ML
4 INJECTION INTRAMUSCULAR; INTRAVENOUS
Status: DISCONTINUED | OUTPATIENT
Start: 2021-01-01 | End: 2021-01-01 | Stop reason: HOSPADM

## 2021-01-01 RX ORDER — INSULIN LISPRO 100 [IU]/ML
0-10 INJECTION, SOLUTION INTRAVENOUS; SUBCUTANEOUS
Status: DISCONTINUED | OUTPATIENT
Start: 2021-01-01 | End: 2021-01-01

## 2021-01-01 RX ORDER — SODIUM CHLORIDE 0.9 % (FLUSH) 0.9 %
5-40 SYRINGE (ML) INJECTION EVERY 8 HOURS
Status: DISCONTINUED | OUTPATIENT
Start: 2021-01-01 | End: 2021-01-01

## 2021-01-01 RX ORDER — INSULIN GLARGINE 100 [IU]/ML
14 INJECTION, SOLUTION SUBCUTANEOUS DAILY
Status: DISCONTINUED | OUTPATIENT
Start: 2021-01-01 | End: 2021-01-01

## 2021-01-01 RX ORDER — CLONAZEPAM 0.5 MG/1
0.5 TABLET ORAL DAILY PRN
Status: DISCONTINUED | OUTPATIENT
Start: 2021-01-01 | End: 2021-01-01 | Stop reason: HOSPADM

## 2021-01-01 RX ORDER — GUAIFENESIN/DEXTROMETHORPHAN 100-10MG/5
5 SYRUP ORAL
Status: DISCONTINUED | OUTPATIENT
Start: 2021-01-01 | End: 2021-01-01 | Stop reason: HOSPADM

## 2021-01-01 RX ORDER — LORAZEPAM 2 MG/ML
2 INJECTION INTRAMUSCULAR
Status: DISCONTINUED | OUTPATIENT
Start: 2021-01-01 | End: 2021-01-01

## 2021-01-01 RX ORDER — SODIUM CHLORIDE 9 MG/ML
250 INJECTION, SOLUTION INTRAVENOUS AS NEEDED
Status: DISCONTINUED | OUTPATIENT
Start: 2021-01-01 | End: 2021-01-01 | Stop reason: HOSPADM

## 2021-01-01 RX ORDER — INSULIN GLARGINE 100 [IU]/ML
18 INJECTION, SOLUTION SUBCUTANEOUS DAILY
Status: DISCONTINUED | OUTPATIENT
Start: 2021-01-01 | End: 2021-01-01

## 2021-01-01 RX ORDER — MORPHINE SULFATE 2 MG/ML
1 INJECTION, SOLUTION INTRAMUSCULAR; INTRAVENOUS
Status: DISCONTINUED | OUTPATIENT
Start: 2021-01-01 | End: 2021-01-01

## 2021-01-01 RX ORDER — POLYETHYLENE GLYCOL 3350 17 G/17G
17 POWDER, FOR SOLUTION ORAL DAILY PRN
Status: DISCONTINUED | OUTPATIENT
Start: 2021-01-01 | End: 2021-01-01 | Stop reason: HOSPADM

## 2021-01-01 RX ORDER — FAMOTIDINE 20 MG/1
20 TABLET, FILM COATED ORAL 2 TIMES DAILY
Status: DISCONTINUED | OUTPATIENT
Start: 2021-01-01 | End: 2021-01-01

## 2021-01-01 RX ORDER — ATORVASTATIN CALCIUM 10 MG/1
10 TABLET, FILM COATED ORAL
Status: DISCONTINUED | OUTPATIENT
Start: 2021-01-01 | End: 2021-01-01

## 2021-01-01 RX ORDER — ACETAMINOPHEN 650 MG/1
650 SUPPOSITORY RECTAL
Status: DISCONTINUED | OUTPATIENT
Start: 2021-01-01 | End: 2021-01-01 | Stop reason: HOSPADM

## 2021-01-01 RX ORDER — MORPHINE SULFATE 2 MG/ML
2 INJECTION, SOLUTION INTRAMUSCULAR; INTRAVENOUS
Status: DISCONTINUED | OUTPATIENT
Start: 2021-01-01 | End: 2021-01-01 | Stop reason: HOSPADM

## 2021-01-01 RX ORDER — QUETIAPINE FUMARATE 25 MG/1
25 TABLET, FILM COATED ORAL ONCE
Status: COMPLETED | OUTPATIENT
Start: 2021-01-01 | End: 2021-01-01

## 2021-01-01 RX ORDER — ACETAMINOPHEN 325 MG/1
650 TABLET ORAL
Status: DISCONTINUED | OUTPATIENT
Start: 2021-01-01 | End: 2021-01-01 | Stop reason: HOSPADM

## 2021-01-01 RX ORDER — MELATONIN
2000 DAILY
Status: DISCONTINUED | OUTPATIENT
Start: 2021-01-17 | End: 2021-01-01

## 2021-01-01 RX ORDER — LISINOPRIL 5 MG/1
2.5 TABLET ORAL DAILY
Status: DISCONTINUED | OUTPATIENT
Start: 2021-01-01 | End: 2021-01-01 | Stop reason: HOSPADM

## 2021-01-01 RX ORDER — INSULIN LISPRO 100 [IU]/ML
5 INJECTION, SOLUTION INTRAVENOUS; SUBCUTANEOUS ONCE
Status: COMPLETED | OUTPATIENT
Start: 2021-01-01 | End: 2021-01-01

## 2021-01-01 RX ORDER — ENOXAPARIN SODIUM 100 MG/ML
30 INJECTION SUBCUTANEOUS EVERY 12 HOURS
Status: DISCONTINUED | OUTPATIENT
Start: 2021-01-01 | End: 2021-01-01

## 2021-01-01 RX ORDER — FINASTERIDE 5 MG/1
5 TABLET, FILM COATED ORAL DAILY
Status: DISCONTINUED | OUTPATIENT
Start: 2021-01-01 | End: 2021-01-01

## 2021-01-01 RX ORDER — HALOPERIDOL 5 MG/ML
2 INJECTION INTRAMUSCULAR
Status: DISCONTINUED | OUTPATIENT
Start: 2021-01-01 | End: 2021-01-01 | Stop reason: HOSPADM

## 2021-01-01 RX ORDER — AZITHROMYCIN 250 MG/1
500 TABLET, FILM COATED ORAL EVERY 24 HOURS
Status: DISCONTINUED | OUTPATIENT
Start: 2021-01-01 | End: 2021-01-01 | Stop reason: HOSPADM

## 2021-01-01 RX ORDER — ALBUTEROL SULFATE 90 UG/1
2 AEROSOL, METERED RESPIRATORY (INHALATION)
Status: DISCONTINUED | OUTPATIENT
Start: 2021-01-01 | End: 2021-01-01

## 2021-01-01 RX ORDER — MELATONIN
6000 DAILY
Status: DISCONTINUED | OUTPATIENT
Start: 2021-01-01 | End: 2021-01-01

## 2021-01-01 RX ORDER — DEXAMETHASONE SODIUM PHOSPHATE 100 MG/10ML
6 INJECTION INTRAMUSCULAR; INTRAVENOUS EVERY 8 HOURS
Status: DISCONTINUED | OUTPATIENT
Start: 2021-01-01 | End: 2021-01-01

## 2021-01-01 RX ORDER — ASCORBIC ACID 500 MG
500 TABLET ORAL 3 TIMES DAILY
Status: DISCONTINUED | OUTPATIENT
Start: 2021-01-01 | End: 2021-01-01

## 2021-01-01 RX ORDER — SODIUM CHLORIDE 0.9 % (FLUSH) 0.9 %
10 SYRINGE (ML) INJECTION
Status: COMPLETED | OUTPATIENT
Start: 2021-01-01 | End: 2021-01-01

## 2021-01-01 RX ORDER — TAMSULOSIN HYDROCHLORIDE 0.4 MG/1
0.4 CAPSULE ORAL DAILY
Status: DISCONTINUED | OUTPATIENT
Start: 2021-01-01 | End: 2021-01-01

## 2021-01-01 RX ORDER — ASPIRIN 81 MG/1
81 TABLET ORAL DAILY
Status: DISCONTINUED | OUTPATIENT
Start: 2021-01-01 | End: 2021-01-01

## 2021-01-01 RX ORDER — FUROSEMIDE 10 MG/ML
20 INJECTION INTRAMUSCULAR; INTRAVENOUS ONCE
Status: COMPLETED | OUTPATIENT
Start: 2021-01-01 | End: 2021-01-01

## 2021-01-01 RX ORDER — SODIUM CHLORIDE 9 MG/ML
30 INJECTION, SOLUTION INTRAVENOUS EVERY 24 HOURS
Status: DISCONTINUED | OUTPATIENT
Start: 2021-01-01 | End: 2021-01-01

## 2021-01-01 RX ADMIN — Medication 220 MG: at 21:56

## 2021-01-01 RX ADMIN — FAMOTIDINE 20 MG: 20 TABLET, FILM COATED ORAL at 17:07

## 2021-01-01 RX ADMIN — MORPHINE SULFATE 1 MG: 2 INJECTION, SOLUTION INTRAMUSCULAR; INTRAVENOUS at 22:30

## 2021-01-01 RX ADMIN — ENOXAPARIN SODIUM 30 MG: 30 INJECTION SUBCUTANEOUS at 08:42

## 2021-01-01 RX ADMIN — INSULIN LISPRO 4 UNITS: 100 INJECTION, SOLUTION INTRAVENOUS; SUBCUTANEOUS at 17:30

## 2021-01-01 RX ADMIN — SODIUM CHLORIDE 30 ML: 900 INJECTION, SOLUTION INTRAVENOUS at 09:00

## 2021-01-01 RX ADMIN — CEFTRIAXONE 2 G: 2 INJECTION, POWDER, FOR SOLUTION INTRAMUSCULAR; INTRAVENOUS at 21:38

## 2021-01-01 RX ADMIN — AZITHROMYCIN MONOHYDRATE 500 MG: 250 TABLET ORAL at 21:37

## 2021-01-01 RX ADMIN — Medication 220 MG: at 09:13

## 2021-01-01 RX ADMIN — FAMOTIDINE 20 MG: 20 TABLET, FILM COATED ORAL at 08:32

## 2021-01-01 RX ADMIN — ENOXAPARIN SODIUM 30 MG: 30 INJECTION SUBCUTANEOUS at 21:39

## 2021-01-01 RX ADMIN — TAMSULOSIN HYDROCHLORIDE 0.4 MG: 0.4 CAPSULE ORAL at 09:13

## 2021-01-01 RX ADMIN — INSULIN LISPRO 4 UNITS: 100 INJECTION, SOLUTION INTRAVENOUS; SUBCUTANEOUS at 17:32

## 2021-01-01 RX ADMIN — ENOXAPARIN SODIUM 30 MG: 30 INJECTION SUBCUTANEOUS at 22:17

## 2021-01-01 RX ADMIN — INSULIN LISPRO 6 UNITS: 100 INJECTION, SOLUTION INTRAVENOUS; SUBCUTANEOUS at 08:30

## 2021-01-01 RX ADMIN — ENOXAPARIN SODIUM 30 MG: 30 INJECTION SUBCUTANEOUS at 08:32

## 2021-01-01 RX ADMIN — CEFTRIAXONE 2 G: 2 INJECTION, POWDER, FOR SOLUTION INTRAMUSCULAR; INTRAVENOUS at 22:17

## 2021-01-01 RX ADMIN — DEXAMETHASONE SODIUM PHOSPHATE 6 MG: 10 INJECTION INTRAMUSCULAR; INTRAVENOUS at 09:36

## 2021-01-01 RX ADMIN — ATORVASTATIN CALCIUM 10 MG: 10 TABLET, FILM COATED ORAL at 21:38

## 2021-01-01 RX ADMIN — LEVOTHYROXINE SODIUM 50 MCG: 0.05 TABLET ORAL at 05:35

## 2021-01-01 RX ADMIN — Medication 220 MG: at 09:26

## 2021-01-01 RX ADMIN — INSULIN LISPRO 2 UNITS: 100 INJECTION, SOLUTION INTRAVENOUS; SUBCUTANEOUS at 08:33

## 2021-01-01 RX ADMIN — ENOXAPARIN SODIUM 30 MG: 30 INJECTION SUBCUTANEOUS at 21:55

## 2021-01-01 RX ADMIN — Medication 10 ML: at 05:36

## 2021-01-01 RX ADMIN — ENOXAPARIN SODIUM 30 MG: 30 INJECTION SUBCUTANEOUS at 09:24

## 2021-01-01 RX ADMIN — INSULIN LISPRO 6 UNITS: 100 INJECTION, SOLUTION INTRAVENOUS; SUBCUTANEOUS at 12:30

## 2021-01-01 RX ADMIN — ONDANSETRON 4 MG: 2 INJECTION INTRAMUSCULAR; INTRAVENOUS at 20:39

## 2021-01-01 RX ADMIN — Medication 10 ML: at 05:07

## 2021-01-01 RX ADMIN — INSULIN LISPRO 2 UNITS: 100 INJECTION, SOLUTION INTRAVENOUS; SUBCUTANEOUS at 13:10

## 2021-01-01 RX ADMIN — MORPHINE SULFATE 1 MG: 2 INJECTION, SOLUTION INTRAMUSCULAR; INTRAVENOUS at 05:30

## 2021-01-01 RX ADMIN — ATORVASTATIN CALCIUM 10 MG: 10 TABLET, FILM COATED ORAL at 21:39

## 2021-01-01 RX ADMIN — GUAIFENESIN AND DEXTROMETHORPHAN 5 ML: 100; 10 SYRUP ORAL at 00:36

## 2021-01-01 RX ADMIN — GUAIFENESIN AND DEXTROMETHORPHAN 5 ML: 100; 10 SYRUP ORAL at 18:29

## 2021-01-01 RX ADMIN — LORAZEPAM 2 MG: 2 INJECTION INTRAMUSCULAR; INTRAVENOUS at 01:37

## 2021-01-01 RX ADMIN — Medication 10 ML: at 05:15

## 2021-01-01 RX ADMIN — AZITHROMYCIN MONOHYDRATE 500 MG: 250 TABLET ORAL at 21:56

## 2021-01-01 RX ADMIN — INSULIN GLARGINE 14 UNITS: 100 INJECTION, SOLUTION SUBCUTANEOUS at 08:33

## 2021-01-01 RX ADMIN — LISINOPRIL 2.5 MG: 5 TABLET ORAL at 08:41

## 2021-01-01 RX ADMIN — DEXAMETHASONE 6 MG: 4 TABLET ORAL at 08:32

## 2021-01-01 RX ADMIN — Medication 10 ML: at 22:12

## 2021-01-01 RX ADMIN — INSULIN LISPRO 8 UNITS: 100 INJECTION, SOLUTION INTRAVENOUS; SUBCUTANEOUS at 22:17

## 2021-01-01 RX ADMIN — Medication 220 MG: at 08:32

## 2021-01-01 RX ADMIN — LEVOTHYROXINE SODIUM 50 MCG: 0.05 TABLET ORAL at 05:06

## 2021-01-01 RX ADMIN — FINASTERIDE 5 MG: 5 TABLET, FILM COATED ORAL at 09:13

## 2021-01-01 RX ADMIN — INSULIN LISPRO 10 UNITS: 100 INJECTION, SOLUTION INTRAVENOUS; SUBCUTANEOUS at 21:39

## 2021-01-01 RX ADMIN — Medication 10 ML: at 14:26

## 2021-01-01 RX ADMIN — MORPHINE SULFATE 2 MG: 2 INJECTION, SOLUTION INTRAMUSCULAR; INTRAVENOUS at 14:33

## 2021-01-01 RX ADMIN — ASPIRIN 81 MG: 81 TABLET ORAL at 09:13

## 2021-01-01 RX ADMIN — TAMSULOSIN HYDROCHLORIDE 0.4 MG: 0.4 CAPSULE ORAL at 08:32

## 2021-01-01 RX ADMIN — LORAZEPAM 2 MG: 2 INJECTION INTRAMUSCULAR; INTRAVENOUS at 21:57

## 2021-01-01 RX ADMIN — INSULIN LISPRO 4 UNITS: 100 INJECTION, SOLUTION INTRAVENOUS; SUBCUTANEOUS at 09:00

## 2021-01-01 RX ADMIN — Medication 10 ML: at 21:40

## 2021-01-01 RX ADMIN — OXYCODONE HYDROCHLORIDE AND ACETAMINOPHEN 500 MG: 500 TABLET ORAL at 21:38

## 2021-01-01 RX ADMIN — INSULIN LISPRO 8 UNITS: 100 INJECTION, SOLUTION INTRAVENOUS; SUBCUTANEOUS at 21:55

## 2021-01-01 RX ADMIN — INSULIN LISPRO 5 UNITS: 100 INJECTION, SOLUTION INTRAVENOUS; SUBCUTANEOUS at 21:40

## 2021-01-01 RX ADMIN — Medication 10 ML: at 11:38

## 2021-01-01 RX ADMIN — ATORVASTATIN CALCIUM 10 MG: 10 TABLET, FILM COATED ORAL at 21:56

## 2021-01-01 RX ADMIN — INSULIN GLARGINE 18 UNITS: 100 INJECTION, SOLUTION SUBCUTANEOUS at 09:35

## 2021-01-01 RX ADMIN — FAMOTIDINE 20 MG: 20 TABLET, FILM COATED ORAL at 21:56

## 2021-01-01 RX ADMIN — FINASTERIDE 5 MG: 5 TABLET, FILM COATED ORAL at 08:32

## 2021-01-01 RX ADMIN — ENOXAPARIN SODIUM 30 MG: 30 INJECTION SUBCUTANEOUS at 09:13

## 2021-01-01 RX ADMIN — VITAMIN D, TAB 1000IU (100/BT) 6 TABLET: 25 TAB at 09:26

## 2021-01-01 RX ADMIN — FINASTERIDE 5 MG: 5 TABLET, FILM COATED ORAL at 08:41

## 2021-01-01 RX ADMIN — AZITHROMYCIN MONOHYDRATE 500 MG: 250 TABLET ORAL at 22:16

## 2021-01-01 RX ADMIN — DEXAMETHASONE SODIUM PHOSPHATE 6 MG: 10 INJECTION INTRAMUSCULAR; INTRAVENOUS at 14:11

## 2021-01-01 RX ADMIN — Medication 10 ML: at 22:24

## 2021-01-01 RX ADMIN — CEFTRIAXONE 2 G: 2 INJECTION, POWDER, FOR SOLUTION INTRAMUSCULAR; INTRAVENOUS at 21:37

## 2021-01-01 RX ADMIN — DEXAMETHASONE 6 MG: 4 TABLET ORAL at 09:13

## 2021-01-01 RX ADMIN — GUAIFENESIN AND DEXTROMETHORPHAN 5 ML: 100; 10 SYRUP ORAL at 16:32

## 2021-01-01 RX ADMIN — TAMSULOSIN HYDROCHLORIDE 0.4 MG: 0.4 CAPSULE ORAL at 08:41

## 2021-01-01 RX ADMIN — ATORVASTATIN CALCIUM 10 MG: 10 TABLET, FILM COATED ORAL at 22:16

## 2021-01-01 RX ADMIN — TAMSULOSIN HYDROCHLORIDE 0.4 MG: 0.4 CAPSULE ORAL at 09:25

## 2021-01-01 RX ADMIN — DEXAMETHASONE 6 MG: 4 TABLET ORAL at 08:41

## 2021-01-01 RX ADMIN — Medication 10 ML: at 14:12

## 2021-01-01 RX ADMIN — INSULIN LISPRO 4 UNITS: 100 INJECTION, SOLUTION INTRAVENOUS; SUBCUTANEOUS at 11:23

## 2021-01-01 RX ADMIN — OXYCODONE HYDROCHLORIDE AND ACETAMINOPHEN 500 MG: 500 TABLET ORAL at 17:32

## 2021-01-01 RX ADMIN — OXYCODONE HYDROCHLORIDE AND ACETAMINOPHEN 500 MG: 500 TABLET ORAL at 21:56

## 2021-01-01 RX ADMIN — Medication 10 ML: at 05:30

## 2021-01-01 RX ADMIN — TUBERCULIN PURIFIED PROTEIN DERIVATIVE 5 UNITS: 5 INJECTION, SOLUTION INTRADERMAL at 22:07

## 2021-01-01 RX ADMIN — INSULIN LISPRO 8 UNITS: 100 INJECTION, SOLUTION INTRAVENOUS; SUBCUTANEOUS at 21:39

## 2021-01-01 RX ADMIN — REMDESIVIR 100 MG: 100 INJECTION, POWDER, LYOPHILIZED, FOR SOLUTION INTRAVENOUS at 09:37

## 2021-01-01 RX ADMIN — SODIUM CHLORIDE 30 ML: 900 INJECTION, SOLUTION INTRAVENOUS at 09:57

## 2021-01-01 RX ADMIN — FINASTERIDE 5 MG: 5 TABLET, FILM COATED ORAL at 09:25

## 2021-01-01 RX ADMIN — AZITHROMYCIN MONOHYDRATE 500 MG: 250 TABLET ORAL at 21:38

## 2021-01-01 RX ADMIN — FAMOTIDINE 20 MG: 20 TABLET, FILM COATED ORAL at 09:25

## 2021-01-01 RX ADMIN — FAMOTIDINE 20 MG: 20 TABLET, FILM COATED ORAL at 17:32

## 2021-01-01 RX ADMIN — FAMOTIDINE 20 MG: 20 TABLET, FILM COATED ORAL at 09:13

## 2021-01-01 RX ADMIN — SODIUM CHLORIDE 500 ML: 900 INJECTION, SOLUTION INTRAVENOUS at 16:53

## 2021-01-01 RX ADMIN — ENOXAPARIN SODIUM 30 MG: 30 INJECTION SUBCUTANEOUS at 21:37

## 2021-01-01 RX ADMIN — GUAIFENESIN AND DEXTROMETHORPHAN 5 ML: 100; 10 SYRUP ORAL at 05:30

## 2021-01-01 RX ADMIN — Medication 10 ML: at 21:51

## 2021-01-01 RX ADMIN — INSULIN LISPRO 2 UNITS: 100 INJECTION, SOLUTION INTRAVENOUS; SUBCUTANEOUS at 12:15

## 2021-01-01 RX ADMIN — LEVOTHYROXINE SODIUM 50 MCG: 0.05 TABLET ORAL at 05:15

## 2021-01-01 RX ADMIN — INSULIN LISPRO 6 UNITS: 100 INJECTION, SOLUTION INTRAVENOUS; SUBCUTANEOUS at 16:48

## 2021-01-01 RX ADMIN — REMDESIVIR 100 MG: 100 INJECTION, POWDER, LYOPHILIZED, FOR SOLUTION INTRAVENOUS at 09:44

## 2021-01-01 RX ADMIN — CEFTRIAXONE 2 G: 2 INJECTION, POWDER, FOR SOLUTION INTRAMUSCULAR; INTRAVENOUS at 21:55

## 2021-01-01 RX ADMIN — REMDESIVIR 200 MG: 100 INJECTION, POWDER, LYOPHILIZED, FOR SOLUTION INTRAVENOUS at 10:30

## 2021-01-01 RX ADMIN — INSULIN GLARGINE 14 UNITS: 100 INJECTION, SOLUTION SUBCUTANEOUS at 09:16

## 2021-01-01 RX ADMIN — FAMOTIDINE 20 MG: 20 TABLET, FILM COATED ORAL at 17:25

## 2021-01-01 RX ADMIN — QUETIAPINE FUMARATE 25 MG: 25 TABLET ORAL at 20:41

## 2021-01-01 RX ADMIN — Medication 10 ML: at 13:39

## 2021-01-01 RX ADMIN — INSULIN LISPRO 6 UNITS: 100 INJECTION, SOLUTION INTRAVENOUS; SUBCUTANEOUS at 11:38

## 2021-01-01 RX ADMIN — INSULIN LISPRO 4 UNITS: 100 INJECTION, SOLUTION INTRAVENOUS; SUBCUTANEOUS at 16:15

## 2021-01-01 RX ADMIN — OXYCODONE HYDROCHLORIDE AND ACETAMINOPHEN 500 MG: 500 TABLET ORAL at 08:41

## 2021-01-01 RX ADMIN — SODIUM CHLORIDE 100 ML: 900 INJECTION, SOLUTION INTRAVENOUS at 11:38

## 2021-01-01 RX ADMIN — OXYCODONE HYDROCHLORIDE AND ACETAMINOPHEN 500 MG: 500 TABLET ORAL at 15:56

## 2021-01-01 RX ADMIN — OXYCODONE HYDROCHLORIDE AND ACETAMINOPHEN 500 MG: 500 TABLET ORAL at 16:14

## 2021-01-01 RX ADMIN — ASPIRIN 81 MG: 81 TABLET ORAL at 08:32

## 2021-01-01 RX ADMIN — OXYCODONE HYDROCHLORIDE AND ACETAMINOPHEN 500 MG: 500 TABLET ORAL at 08:32

## 2021-01-01 RX ADMIN — OXYCODONE HYDROCHLORIDE AND ACETAMINOPHEN 500 MG: 500 TABLET ORAL at 21:39

## 2021-01-01 RX ADMIN — ASPIRIN 81 MG: 81 TABLET ORAL at 09:26

## 2021-01-01 RX ADMIN — FAMOTIDINE 20 MG: 20 TABLET, FILM COATED ORAL at 18:25

## 2021-01-01 RX ADMIN — ASPIRIN 81 MG: 81 TABLET ORAL at 08:41

## 2021-01-01 RX ADMIN — Medication 10 ML: at 13:47

## 2021-01-01 RX ADMIN — DEXAMETHASONE SODIUM PHOSPHATE 10 MG: 10 INJECTION INTRAMUSCULAR; INTRAVENOUS at 17:30

## 2021-01-01 RX ADMIN — IOPAMIDOL 100 ML: 755 INJECTION, SOLUTION INTRAVENOUS at 11:38

## 2021-01-01 RX ADMIN — FUROSEMIDE 20 MG: 10 INJECTION, SOLUTION INTRAMUSCULAR; INTRAVENOUS at 11:02

## 2021-01-01 RX ADMIN — OXYCODONE HYDROCHLORIDE AND ACETAMINOPHEN 500 MG: 500 TABLET ORAL at 09:25

## 2021-01-01 RX ADMIN — OXYCODONE HYDROCHLORIDE AND ACETAMINOPHEN 500 MG: 500 TABLET ORAL at 22:16

## 2021-01-01 RX ADMIN — INSULIN LISPRO 4 UNITS: 100 INJECTION, SOLUTION INTRAVENOUS; SUBCUTANEOUS at 09:28

## 2021-01-01 RX ADMIN — OXYCODONE HYDROCHLORIDE AND ACETAMINOPHEN 500 MG: 500 TABLET ORAL at 15:53

## 2021-01-01 RX ADMIN — ALBUTEROL SULFATE 2 PUFF: 108 INHALANT RESPIRATORY (INHALATION) at 15:31

## 2021-01-01 RX ADMIN — FAMOTIDINE 20 MG: 20 TABLET, FILM COATED ORAL at 08:41

## 2021-01-01 RX ADMIN — LEVOTHYROXINE SODIUM 50 MCG: 0.05 TABLET ORAL at 05:30

## 2021-01-01 RX ADMIN — REMDESIVIR 100 MG: 100 INJECTION, POWDER, LYOPHILIZED, FOR SOLUTION INTRAVENOUS at 09:12

## 2021-01-01 RX ADMIN — OXYCODONE HYDROCHLORIDE AND ACETAMINOPHEN 500 MG: 500 TABLET ORAL at 09:13

## 2021-01-06 PROBLEM — Z20.822 SUSPECTED COVID-19 VIRUS INFECTION: Status: ACTIVE | Noted: 2021-01-01

## 2021-01-06 PROBLEM — I10 HYPERTENSION: Chronic | Status: ACTIVE | Noted: 2021-01-01

## 2021-01-06 PROBLEM — J96.01 ACUTE RESPIRATORY FAILURE WITH HYPOXIA (HCC): Status: ACTIVE | Noted: 2021-01-01

## 2021-01-06 NOTE — H&P
Hospitalist H&P Note     Admit Date:  No admission date for patient encounter. Name:  Sally Ellington   Age:  78 y.o.  :  1940   MRN:  [id number]   PCP:  Nanda Delacruz MD  Treatment Team: Attending Provider: Kwaku Og MD    HPI:     CC:   Shortness of breath      Mr. Jimmy Fallon is a 77 yo male with PMH of HTN, DM2 evaluated with 10 days of malaise, dyspnea, anorexia. He had positive sick contact though unconfirmed as COVID, 2 weeks prior. He has progressively declined and came to the ED where he has O2 sats in 80s, improved with 5 L NC. CXR shows pulmonary infiltrates. COVID pending at time of interview. Denies fever, ear or throat pain, diarrhea, focal weakness of paresthesia     10 systems reviewed and negative except as noted in HPI. Past Medical History:   Diagnosis Date    Arthritis     hx of Reiters syndrome    Diabetes ([address] type 2 since     oral agents.  bs: 110-130. hypo at 90    Elevated PSA     Generalized anxiety disorder     Gross hematuria     Impacted cerumen     Impotence of organic origin     Other malaise and fatigue 2015    Pain in joint, ankle and foot     Primary hypercoagulable state ([address]     Psychiatric disorder     minimal anxiety and depression     Pure hypercholesterolemia 2015    Shingles     Sleep apnea     just DX - no C PAP yet     Straining on urination       Past Surgical History:   Procedure Laterality Date    COLONOSCOPY N/A 2017    COLONOSCOPY / BMI 25 performed by Dakotah Tilley MD at [address]      bilateral with iol    HX CATARACT REMOVAL      HX HERNIA REPAIR  bilateral    HX HERNIA REPAIR Right 2019    HX OTHER SURGICAL Left     shoulder    HX OTHER SURGICAL Right     knee    HX RETINAL DETACHMENT REPAIR Left     UT COLSC FLX W/RMVL OF TUMOR POLYP LESION SNARE TQ  2017           Allergies   Allergen Reactions    Penicillin G Rash     As a child Social History     Tobacco Use    Smoking status: Former Smoker     Packs/day: 2.00     Years: 20.00     Pack years: 40.00    Smokeless tobacco: Never Used    Tobacco comment: quit 1976   Substance Use Topics    Alcohol use: Yes     Alcohol/week: 6.0 standard drinks     Types: 6 Glasses of wine per week      Family History   Problem Relation Age of Onset    Cancer Mother     Dementia Mother     Psychiatric Disorder Mother     Cancer Sister         breast    Hypertension Father       Immunization History   Administered Date(s) Administered    Influenza High Dose Vaccine PF 01/29/2019    Influenza Vaccine 12/28/2015    Influenza Vaccine (Quad) PF (>6 Mo Flulaval, Fluarix, and >3 Yrs Afluria, Fluzone 14333) 12/02/2016    Influenza Vaccine (Tri) Adjuvanted (>65 Yrs FLUAD TRI 67170) 09/24/2019    Pneumococcal Conjugate (PCV-13) 06/28/2016    Tdap 08/23/2017     PTA Medications:  Cannot display prior to admission medications because the patient has not been admitted in this contact. Objective:   No data found. No intake or output data in the 24 hours ending 01/06/21 1851    Physical Exam:  General:    Alert. No distress, elderly  Eyes:   Normal sclera. Extraocular movements intact. ENT:  Normocephalic, atraumatic. CV:   RRR. No m/r/g. No edema  Lungs:  CTAB. No wheezing, rhonchi, or rales. Abdomen: Soft, nontender, nondistended. Present BS  Extremities: Warm and dry. .  Neurologic:  grossly intact. Skin:     No rashes or jaundice. Normal coloration  Psych:  Normal mood and affect. I reviewed the labs, imaging, EKGs, telemetry, and other studies done this admission.         Data Review:   Recent Results (from the past 24 hour(s))   CBC WITH AUTOMATED DIFF    Collection Time: 01/06/21  4:43 PM   Result Value Ref Range    WBC 9.4 4.3 - 11.1 K/uL    RBC 4.71 4.23 - 5.6 M/uL    HGB 14.7 13.6 - 17.2 g/dL    HCT 43.3 41.1 - 50.3 %    MCV 91.9 79.6 - 97.8 FL    MCH 31.2 26.1 - 32.9 PG MCHC 33.9 31.4 - 35.0 g/dL    RDW 13.2 11.9 - 14.6 %    PLATELET 405 050 - 456 K/uL    MPV 9.6 9.4 - 12.3 FL    ABSOLUTE NRBC 0.00 0.0 - 0.2 K/uL    NEUTROPHILS 84 (H) 43 - 78 %    LYMPHOCYTES 4 (L) 13 - 44 %    MONOCYTES 9 4.0 - 12.0 %    EOSINOPHILS 3 0.5 - 7.8 %    BASOPHILS 0 0.0 - 2.0 %    IMMATURE GRANULOCYTES 0 0.0 - 5.0 %    ABS. NEUTROPHILS 7.9 1.7 - 8.2 K/UL    ABS. LYMPHOCYTES 0.4 (L) 0.5 - 4.6 K/UL    ABS. MONOCYTES 0.8 0.1 - 1.3 K/UL    ABS. EOSINOPHILS 0.3 0.0 - 0.8 K/UL    ABS. BASOPHILS 0.0 0.0 - 0.2 K/UL    ABS. IMM. GRANS. 0.0 0.0 - 0.5 K/UL    RBC COMMENTS SLIGHT  ANISOCYTOSIS + POIKILOCYTOSIS        WBC COMMENTS Result Confirmed By Smear      PLATELET COMMENTS LARGE FORMS PRESENT     DF AUTOMATED     METABOLIC PANEL, COMPREHENSIVE    Collection Time: 01/06/21  4:43 PM   Result Value Ref Range    Sodium 131 (L) 136 - 145 mmol/L    Potassium 4.6 3.5 - 5.1 mmol/L    Chloride 99 98 - 107 mmol/L    CO2 23 21 - 32 mmol/L    Anion gap 9 7 - 16 mmol/L    Glucose 263 (H) 65 - 100 mg/dL    BUN 31 (H) 8 - 23 MG/DL    Creatinine 1.28 0.8 - 1.5 MG/DL    GFR est AA >60 >60 ml/min/1.73m2    GFR est non-AA 57 (L) >60 ml/min/1.73m2    Calcium 8.9 8.3 - 10.4 MG/DL    Bilirubin, total 1.0 0.2 - 1.1 MG/DL    ALT (SGPT) 41 12 - 65 U/L    AST (SGOT) 39 (H) 15 - 37 U/L    Alk. phosphatase 57 50 - 136 U/L    Protein, total 7.2 6.3 - 8.2 g/dL    Albumin 3.0 (L) 3.2 - 4.6 g/dL    Globulin 4.2 (H) 2.3 - 3.5 g/dL    A-G Ratio 0.7 (L) 1.2 - 3.5     TROPONIN-HIGH SENSITIVITY    Collection Time: 01/06/21  4:43 PM   Result Value Ref Range    Troponin-High Sensitivity 5.4 0 - 14 pg/mL   SARS-COV-2    Collection Time: 01/06/21  5:31 PM   Result Value Ref Range    Specimen source Nasopharyngeal      COVID-19 rapid test Detected (AA) NOTD         All Micro Results     None          Other Studies:  Xr Chest Port    Result Date: 1/6/2021  Chest X-ray INDICATION: Shortness of breath A portable AP view of the chest was obtained. FINDINGS: There are bilateral infiltrates, most prominent in the left lung base. The heart size is normal.  The bony thorax is intact.       IMPRESSION: Bilateral pulmonary infiltrates       Assessment and Plan:     Hospital Problems as of 1/6/2021 Date Reviewed: 2/25/2020          Codes Class Noted - Resolved POA    * (Principal) Acute respiratory failure with hypoxia ([facility] ICD-10-CM: J96.01  ICD-9-CM: 518.81  1/6/2021 - Present Yes        Suspected COVID-19 virus infection ICD-10-CM: Z20.822  ICD-9-CM: V01.79  1/6/2021 - Present Yes        Hypertension (Chronic) ICD-10-CM: I10  ICD-9-CM: 401.9  1/6/2021 - Present Yes        OMAR (obstructive sleep apnea) ICD-10-CM: I38.27  ICD-9-CM: 327.23  10/26/2017 - Present Yes        Diabetes mellitus type 2, controlled ([facility] ICD-10-CM: E11.9  ICD-9-CM: 250.00  6/28/2016 - Present Yes        Generalized anxiety disorder ICD-10-CM: F41.1  ICD-9-CM: 300.02  7/9/2015 - Present Yes              · Acute hypoxic respiratory failure with suspected COVID19 pneumonia:  · Admit to [place]  · Contact isolation  · Decadron D 1/10  · Rocephin and azithromycin D 1/5  · Will need to discuss plasma/ remdesivir pending COVID swab  · O2 to wean as tolerant   · Vitamin c and zinc   · Check INR, ddimer, LDH      · DM2:  · SSI      · HTN:  · continued lisinopril      · Hyponatremia:  · followup labs      · CKD:  · followup BMP    Discharge planning:    DVT ppx: lovenox BID  Code status:  Full  Estimated LOS:  Greater than 2 midnights  Risk:  high  Care plan: harvey winn   Signed:  Dalila Guardado MD

## 2021-01-06 NOTE — ED NOTES
Pt tested positive for Covid and room 634 was assigned downtown. Pt is aware and [facility] has been called for transport.

## 2021-01-06 NOTE — ED PROVIDER NOTES
Patient is 24-year-old male presents to [place] emergency department complaining of fatigue, weakness, shortness of breath and dry cough. States his symptoms have been ongoing for the past 4 to 5 days. Denies any known exposure. States he was exposed at Thu time to a family member who potentially had COVID-19, they found out after the fact. Patient denies any chest pain. Reports calling EMS given his increased fatigue, weakness and shortness of breath. EMS reports patient's O2 saturation was 85 to 88% on room air. Patient currently satting in the low 90s on 4 L via nasal cannula. Patient has no history of lung problems. Past Medical History:  
Diagnosis Date  Arthritis   
 hx of Reiters syndrome  Diabetes ([address] type 2 since 2001  
 oral agents. bs: 110-130. hypo at 90  Elevated PSA  Generalized anxiety disorder  Gross hematuria  Impacted cerumen  Impotence of organic origin  Other malaise and fatigue 7/9/2015  Pain in joint, ankle and foot  Primary hypercoagulable state ([address]  Psychiatric disorder   
 minimal anxiety and depression  Pure hypercholesterolemia 7/9/2015  Shingles  Sleep apnea   
 just DX - no C PAP yet  Straining on urination Past Surgical History:  
Procedure Laterality Date  COLONOSCOPY N/A 9/25/2017 COLONOSCOPY / BMI 25 performed by Howie Anderson MD at [address] CATARACT REMOVAL  2013  
 bilateral with iol  HX CATARACT REMOVAL    
 HX HERNIA REPAIR  bilateral  
 HX HERNIA REPAIR Right 06/12/2019  HX OTHER SURGICAL Left 1980's  
 shoulder  HX OTHER SURGICAL Right 1986  
 knee  HX RETINAL DETACHMENT REPAIR Left  [facility] COLSC FLX W/RMVL OF TUMOR POLYP LESION SNARE TQ  9/25/2017 Family History:  
Problem Relation Age of Onset  Cancer Mother  Dementia Mother  Psychiatric Disorder Mother  Cancer Sister   
     breast  
 Hypertension Father Social History Socioeconomic History  Marital status:  Spouse name: Not on file  Number of children: Not on file  Years of education: Not on file  Highest education level: Not on file Occupational History  Not on file Social Needs  Financial resource strain: Not on file  Food insecurity Worry: Not on file Inability: Not on file  Transportation needs Medical: Not on file Non-medical: Not on file Tobacco Use  Smoking status: Former Smoker Packs/day: 2.00 Years: 20.00 Pack years: 40.00  Smokeless tobacco: Never Used  Tobacco comment: quit 1976 Substance and Sexual Activity  Alcohol use: Yes Alcohol/week: 6.0 standard drinks Types: 6 Glasses of wine per week  Drug use: No  
 Sexual activity: Not on file Lifestyle  Physical activity Days per week: Not on file Minutes per session: Not on file  Stress: Not on file Relationships  Social connections Talks on phone: Not on file Gets together: Not on file Attends Caodaism service: Not on file Active member of club or organization: Not on file Attends meetings of clubs or organizations: Not on file Relationship status: Not on file  Intimate partner violence Fear of current or ex partner: Not on file Emotionally abused: Not on file Physically abused: Not on file Forced sexual activity: Not on file Other Topics Concern  Not on file Social History Narrative  Not on file ALLERGIES: Penicillin g Review of Systems Constitutional: Positive for fatigue. Negative for chills and fever. HENT: Negative for congestion, rhinorrhea, sinus pressure and sore throat. Eyes: Negative for visual disturbance. Respiratory: Positive for cough. Negative for shortness of breath. Cardiovascular: Negative for chest pain. Gastrointestinal: Negative for abdominal pain, blood in stool, diarrhea, nausea and vomiting. Endocrine: Negative for polyuria. Genitourinary: Negative for difficulty urinating and dysuria. Musculoskeletal: Negative for arthralgias, neck pain and neck stiffness. Skin: Negative for color change and rash. Neurological: Positive for weakness. Negative for syncope, speech difficulty, numbness and headaches. Psychiatric/Behavioral: Negative for behavioral problems, confusion and suicidal ideas. All other systems reviewed and are negative. There were no vitals filed for this visit. Physical Exam 
Vitals signs and nursing note reviewed. Constitutional:   
   Appearance: Normal appearance. HENT:  
   Head: Normocephalic and atraumatic. Nose: Nose normal.  
   Mouth/Throat:  
   Mouth: Mucous membranes are moist.  
Eyes:  
   Extraocular Movements: Extraocular movements intact. Neck: Musculoskeletal: Normal range of motion. Cardiovascular:  
   Rate and Rhythm: Normal rate and regular rhythm. Heart sounds: Normal heart sounds. Comments: Rate range from the high 90s to low 100s Pulmonary:  
   Effort: Pulmonary effort is normal.  
   Breath sounds: Normal breath sounds. No wheezing, rhonchi or rales. Abdominal:  
   General: Abdomen is flat. Palpations: Abdomen is soft. Tenderness: There is no abdominal tenderness. There is no guarding. Musculoskeletal: Normal range of motion. Skin: 
   General: Skin is warm and dry. Neurological:  
   General: No focal deficit present. Mental Status: He is alert and oriented to person, place, and time. Psychiatric:     
   Mood and Affect: Mood normal.  
 
  
 
MDM Number of Diagnoses or Management Options Hypoxia Diagnosis management comments: Patient was seen and examined wearing appropriate PPE.  Patient complains of shortness of breath, concern for covid 19.  Requiring 4L via NC currently.  Patient given 10 mg IV Decadron.  Patient did receive 500 cc bolus IV fluid in route with EMS.  EKG obtained shows sinus rhythm, rate 95, , QRS 84, QTc 402, normal axis, no significant ST elevation or depression.  Labs are normal white count, stable H&H, grossly normal electrolytes, BUN 31, Creatinine 1.28, GFR 57.  Patient's glucose is elevated but he denies taking his oral diabetic medications secondary to decreased p.o. intake over the last few days.  Troponin 5.4.  Chest x-ray shows bilateral pulmonary infiltrates. Covid 19 test was positive.  Patient requiring supplemental oxygen to maintain normal O2 saturation.  Thus he would benefit from medical admission.  I spoke with the hospitalist agreed to admit this patient for continued evaluation and treatment.  Patient voiced understanding and agreement.  
 
  
 
Procedures

## 2021-01-06 NOTE — ED NOTES
Pt to ER from home with EMS after he states he has had cold like symptoms for about one week but has become very weak over the past four days and has not been able to eat. Pt found to be hypoxic with EMS at 85% with walking and 88% with sitting. Pt has a mask in ER.

## 2021-01-07 NOTE — DIABETES MGMT
Patient COVID +. Patient's A1c 7.2 in August. Patient was taking glipizide and metformin at home per PTA med list. Blood glucose 263 on admission. Blood glucose ranged 263-308 yesterday with patient receiving dexamethasone 10 mg and Humalog 8 units. Blood glucose 268 this morning and 283 at lunch. Spoke with provider and received and order to start Lantus 14 units daily.

## 2021-01-07 NOTE — PROGRESS NOTES
Problem: Falls - Risk of  Goal: *Absence of Falls  Description: Document Reginald Sol Fall Risk and appropriate interventions in the flowsheet.   Outcome: Progressing Towards Goal  Note: Fall Risk Interventions:            Medication Interventions: Teach patient to arise slowly, Patient to call before getting OOB    Elimination Interventions: Patient to call for help with toileting needs, Stay With Me (per policy), Toilet paper/wipes in reach

## 2021-01-07 NOTE — PROGRESS NOTES
Updated patient on his COVID positive  Status, agrees to plasma and remdesvir that are ordered  Alison Mederos MD

## 2021-01-07 NOTE — PROGRESS NOTES
ACUTE PHYSICAL THERAPY GOALS:  (Developed with and agreed upon by patient and/or caregiver. )  LTG:  (1.)Mr. Angelito Ramey will move from supine to sit and sit to supine , scoot up and down and roll side to side in bed with INDEPENDENT within 7 treatment day(s). (2.)Mr. Angelito Ramey will transfer from bed to chair and chair to bed with INDEPENDENT using the least restrictive device within 7 treatment day(s). (3.)Mr. Angelito Ramey will ambulate with INDEPENDENT for 250+ feet with the least restrictive device within 7 treatment day(s) while maintaining normal vital signs. (4.)Mr. Angelito Ramey will participate in 23+ minutes of therapeutic activity within 7 treatment days while maintaining normal vital signs throughout.   ________________________________________________________________________________________________       PHYSICAL THERAPY ASSESSMENT: Initial Assessment and AM PT Treatment Day # 1      Darylene Speedy is a 78 y.o. male   PRIMARY DIAGNOSIS: Acute respiratory failure with hypoxia ([address]  Acute respiratory failure with hypoxia ([facility] [J96.01]       Reason for Referral:    ICD-10: Treatment Diagnosis: Difficulty in walking, Not elsewhere classified (R26.2)  [place] / [place] / Product Type: Medicare /     ASSESSMENT:     REHAB RECOMMENDATIONS:   Recommendation to date pending progress:  [address] [place] [address]  Equipment:    To Be Determined     PRIOR LEVEL OF FUNCTION:  (Prior to Hospitalization) INITIAL/CURRENT LEVEL OF FUNCTION:  (Most Recently Demonstrated)   Bed Mobility:   Independent  Sit to Stand:   Independent  Transfers:   Independent  Gait/Mobility:   Independent Bed Mobility:   Contact Guard Assistance  Sit to Stand:  [employer] Assistance  Transfers:   Contact Guard Assistance  Gait/Mobility:   Contact Guard Assistance     ASSESSMENT:  Mr. Angelito Ramey with decreased bed mobility, transfers, ambulation, balance, activity tolerance, and overall general functional mobility s/p hospital admission on 1/6/21 with weakness, hypoxic, SOB/cough, COVID +. Pt A & O x 4 this date, 5 L O2 via n.c, O2 90% at rest.  Pt reports lives alone, independent at baseline, drives, RA at home, no falls. Pt CGA to SBA for transfers, ambulated in room without assistive device x 10', O2 stats 88%. Pt seated break, cues for pursed lip breathing, energy conservation. Pt with little reserve with activity. PT to cont to follow for acute care needs to address deficits noted above. Pt would benefit from HHPT at discharge pending progress with mobility. SUBJECTIVE:   Mr. Kanwal Elkins states, \"I am feeling a little better. \"    SOCIAL HISTORY/LIVING ENVIRONMENT:   Home Environment: Private residence  # Steps to Enter: 0  One/Two Story Residence: One story  Living Alone: Yes  Support Systems: Child(romina), Family member(s), Friends \ neighbors  OBJECTIVE:     PAIN: VITAL SIGNS: LINES/DRAINS:   Pre Treatment: Pain Screen  Pain Scale 1: Numeric (0 - 10)  Pain Intensity 1: 0  Post Treatment: 0 Vital Signs  O2 Sat (%): 90 %  O2 Device: Nasal cannula  O2 Flow Rate (L/min): 5 l/min IV  O2 Device: Nasal cannula     GROSS EVALUATION:  B LE Within Functional Limits Abnormal/ Functional Abnormal/ Non-Functional (see comments) Not Tested Comments:   AROM [x] [] [] []    PROM [x] [] [] []    Strength [x] [] [] []    Balance [x] [] [] []    Posture [] [x] [] []    Sensation [] [] [] [x]    Coordination [] [x] [] []    Tone [] [] [] [x]    Edema [] [] [] [x]    Activity Tolerance [] [x] [] []     [] [] [] []      COGNITION/  PERCEPTION: Intact Impaired   (see comments) Comments:   Orientation [x] []    Vision [x] []    Hearing [x] []    Command Following [x] []    Safety Awareness [x] []     [] []      MOBILITY: I Mod I S SBA CGA Min Mod Max Total  NT x2 Comments:   Bed Mobility    Rolling [] [] [] [x] [] [] [] [] [] [] []    Supine to Sit [] [] [] [] [] [] [] [] [] [x] []    Scooting [] [] [] [x] [] [] [] [] [] [] []    Sit to Supine [] [] [] [] [x] [] [] [] [] [] []    Transfers    Sit to Stand [] [] [] [] [x] [] [] [] [] [] []    Bed to Chair [] [] [] [] [x] [] [] [] [] [] []    Stand to Sit [] [] [] [] [x] [] [] [] [] [] []    I=Independent, Mod I=Modified Independent, S=Supervision, SBA=Standby Assistance, CGA=Contact Guard Assistance,   Min=Minimal Assistance, Mod=Moderate Assistance, Max=Maximal Assistance, Total=Total Assistance, NT=Not Tested  GAIT: I Mod I S SBA CGA Min Mod Max Total  NT x2 Comments:   Level of Assistance [] [] [] [] [x] [] [] [] [] [] [] 5 L O2   Distance 10    DME None    Gait Quality Shuffled steps, slow jake    I=Independent, Mod I=Modified Independent, S=Supervision, SBA=Standby Assistance, CGA=Contact Guard Assistance,   Min=Minimal Assistance, Mod=Moderate Assistance, Max=Maximal Assistance, Total=Total Assistance, NT=Not Tested    [address] [facility] [address] [address] AM-[facility] Form       How much difficulty does the patient currently have. .. Unable A Lot A Little None   1. Turning over in bed (including adjusting bedclothes, sheets and blankets)? [] 1   [] 2   [x] 3   [] 4   2. Sitting down on and standing up from a chair with arms ( e.g., wheelchair, bedside commode, etc.)   [] 1   [] 2   [x] 3   [] 4   3. Moving from lying on back to sitting on the side of the bed? [] 1   [] 2   [x] 3   [] 4   How much help from another person does the patient currently need. .. Total A Lot A Little None   4. Moving to and from a bed to a chair (including a wheelchair)? [] 1   [] 2   [x] 3   [] 4   5. Need to walk in hospital room? [] 1   [] 2   [x] 3   [] 4   6. Climbing 3-5 steps with a railing? [] 1   [x] 2   [] 3   [] 4   © 2007, Trustees of [address] [place] [address] [address] [address], under license to [employer].  All rights reserved     Score:  Initial: 17 Most Recent: X (Date: -- )    Interpretation of Tool:  Represents activities that are increasingly more difficult (i.e. Bed mobility, Transfers, Gait). PLAN:   FREQUENCY/DURATION: PT Plan of Care: 3 times/week for duration of hospital stay or until stated goals are met, whichever comes first.    PROBLEM LIST:   (Skilled intervention is medically necessary to address:)  1. Decreased ADL/Functional Activities  2. Decreased Activity Tolerance  3. Decreased Balance  4. Decreased Coordination  5. Decreased Gait Ability  6. Decreased Strength  7. Decreased Transfer Abilities   INTERVENTIONS PLANNED:   (Benefits and precautions of physical therapy have been discussed with the patient.)  1. Therapeutic Activity  2. Therapeutic Exercise/HEP  3. Neuromuscular Re-education  4. Gait Training  5. Manual Therapy  6. Education     TREATMENT:     EVALUATION: Low Complexity : (Untimed Charge)    TREATMENT:   ($$ Therapeutic Exercises: 8-22 mins    )  Therapeutic Exercise (10 Minutes): Activities to include transfers, bed mobility, static and dynamic standing balance, energy conservation, pursed lip breathing to improve functional activity tolerance, strength and mobility.      AFTER TREATMENT POSITION/PRECAUTIONS:  Bed, Needs within reach and RN notified    INTERDISCIPLINARY COLLABORATION:  RN/PCT    TOTAL TREATMENT DURATION:  PT Patient Time In/Time Out  Time In: 1120  Time Out: [address], PT

## 2021-01-07 NOTE — PROGRESS NOTES
CM contacted patient in room to complete assessment. Demographic information confirmed by the patient. The patient lives alone in a one story home with 2 steps at the entrance. Patient confirmed he is able to complete ADL's independently and confirmed no DME. Diabetes: YES    Patient receives his medications from [facility] on [address]. Patient voiced no difficulty with obtaining medications in the community. Discharge planning: PT/OT has been consulted. Patient denies any history of [facility] services or STR. Patient voiced no needs at this time. CM will monitor therapy's recommendations and plan of care. Please consult or notify CM if any needs shall arise. CM continues to monitor plan of care. Care Management Interventions  PCP Verified by CM: Yes  Mode of Transport at Discharge:  Other (see comment)(TBD based upon need. )  Transition of Care Consult (CM Consult): Discharge Planning  Discharge Durable Medical Equipment: No  Physical Therapy Consult: Yes  Occupational Therapy Consult: Yes  Speech Therapy Consult: No  Current Support Network: Lives Alone, Own Home  Confirm Follow Up Transport: Self  [place] Resource Information Provided?: No  Discharge Location  Discharge Placement: Unable to determine at this time

## 2021-01-07 NOTE — PROGRESS NOTES
[employer] received request to offer assistance with [address]. Spoke with nurse to ensure that patient was sufficiently alert and oriented to proceed with consult. I introduced the document to  Erinn Sánchez and he declined needing assistance stating he planned to complete it in the future with an . 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 I actively listened as he shared about his life and family. He is also very grateful for the care provided by staff. No additional needs were voiced. Assurance of prayers offered. Rev. Rui Gonzalez MDiv, BS  Board Certified

## 2021-01-07 NOTE — PROGRESS NOTES
ACUTE OT GOALS:  (Developed with and agreed upon by patient and/or caregiver.)  1. Pt will toilet with SBA   2. Pt will complete functional mobility for ADLs with SBA  3. Pt will complete lower body dressing with SBA using AE as needed  4. Pt will complete grooming and hygiene at sink with SBA  5. Pt will demonstrate independence with HEP to promote increased BUE strength and functional use for ADLs  6. Pt will tolerate 23 minutes functional activity with min or fewer rest breaks to promote increased endurance for ADLs  7. Pt will independently demonstrate/ verbalize 2+ energy conservation techniques to promote increased endurance for ADLs      Timeframe: 7 days      OCCUPATIONAL THERAPY ASSESSMENT: Initial Assessment and Daily Note OT Treatment Day # 1    Sally Ellington is a 78 y.o. male   PRIMARY DIAGNOSIS: Acute respiratory failure with hypoxia (HCC)  Acute respiratory failure with hypoxia ([facility] [address] [J96.01]       Reason for Referral:  Generalized weakness, SOB, malaise  ICD-10: Treatment Diagnosis: Generalized Muscle Weakness (M62.81)  [place] / Product Type: Medicare /   ASSESSMENT:     REHAB RECOMMENDATIONS:   Recommendation to date pending progress:  [address] [place] [address] Therapy  Equipment:    3 in 1 Bedside Commode     PRIOR LEVEL OF FUNCTION:  (Prior to Hospitalization)  INITIAL/CURRENT LEVEL OF FUNCTION:  (Most Recently Demonstrated)   Bathing:   Independent  Dressing:   Independent  Feeding/Grooming:   Independent  Toileting:   Independent  Functional Mobility:   Independent Bathing:   Contact Guard Assistance  Dressing:   Contact Guard Assistance  Feeding/Grooming:   Contact Guard Assistance  Toileting:   Contact Guard Assistance  Functional Mobility:   Contact Guard Assistance     ASSESSMENT:  Mr. Jimmy Fallon presented generally weak with deficits in endurance, mobility, balance, and strength impacting ADLs.  Pt lives alone and is independent at baseline, does not use any DME. Pt reports that he does not have any assistance from his girlfriend right now as she is also sick with COVID. Today, pt donned socks and mobilized with CGA w/o an AD, completed grooming and hygiene seated with set up. Pt desaturated to 87% on 5L and required increase to 9L to maintain SpO2 90% and above, stable on 5L at rest by end of session. Pt fatigued and became SOB quickly with light exertion. Pt is below his functional baseline and would benefit from skilled OT services to address deficits. SUBJECTIVE:   Mr. Jonas Lefort states, \"I'm not feeling well. \"    SOCIAL HISTORY/LIVING ENVIRONMENT:   Home Environment: Private residence  # Steps to Enter: 0  One/Two Story Residence: One story  Living Alone: Yes  Support Systems: Child(romina), Family member(s), Friends \ neighbors    OBJECTIVE:     PAIN: VITAL SIGNS: LINES/DRAINS:   Pre Treatment: Pain Screen  Pain Scale 1: Numeric (0 - 10)  Pain Intensity 1: 0  Post Treatment: 0     O2 Device: Nasal cannula     GROSS EVALUATION:  BUE Within Functional Limits Abnormal/ Functional Abnormal/ Non-Functional (see comments) Not Tested Comments:   AROM [x] [] [] []    PROM [] [] [] []    Strength [] [x] [] []    Balance [] [x] [] []    Posture [] [] [] []    Sensation [] [] [] []    Coordination [] [] [] []    Tone [] [] [] []    Edema [] [] [] []    Activity Tolerance [] [x] [] []     [] [] [] []      COGNITION/  PERCEPTION: Intact Impaired   (see comments) Comments:   Orientation [x] []    Vision [x] []    Hearing [x] []    Judgment/ Insight [x] []    Attention [x] []    Memory [x] []    Command Following [x] []    Emotional Regulation [x] []     [] []      ACTIVITIES OF DAILY LIVING: I Mod I S SBA CGA Min Mod Max Total NT Comments   BASIC ADLs:              Bathing/ Showering [] [] [] [] [] [] [] [] [] []    Toileting [] [] [] [] [] [] [] [] [] []    Dressing [] [] [] [] [x] [] [] [] [] []    Feeding [] [] [] [] [] [] [] [] [] []    Grooming [] [] [] [] [x] [] [] [] [] []    Personal Device Care [] [] [] [] [] [] [] [] [] []    Functional Mobility [] [] [] [] [x] [] [] [] [] []    I=Independent, Mod I=Modified Independent, S=Supervision, SBA=Standby Assistance, CGA=Contact Guard Assistance,   Min=Minimal Assistance, Mod=Moderate Assistance, Max=Maximal Assistance, Total=Total Assistance, NT=Not Tested    MOBILITY: I Mod I S SBA CGA Min Mod Max Total  NT x2 Comments:   Supine to sit [] [] [x] [] [] [] [] [] [] [] []    Sit to supine [] [] [] [] [] [] [] [] [] [] []    Sit to stand [] [] [] [] [x] [] [] [] [] [] []    Bed to chair [] [] [] [] [x] [] [] [] [] [] []    I=Independent, Mod I=Modified Independent, S=Supervision, SBA=Standby Assistance, CGA=Contact Guard Assistance,   Min=Minimal Assistance, Mod=Moderate Assistance, Max=Maximal Assistance, Total=Total Assistance, NT=Not Tested    [facility] AM-PAC™ “6 Clicks”   Daily Activity Inpatient Short Form        How much help from another person does the patient currently need... Total A Lot A Little None   1.  Putting on and taking off regular lower body clothing?   [] 1   [] 2   [x] 3   [] 4   2.  Bathing (including washing, rinsing, drying)?   [] 1   [] 2   [x] 3   [] 4   3.  Toileting, which includes using toilet, bedpan or urinal?   [] 1   [] 2   [x] 3   [] 4   4.  Putting on and taking off regular upper body clothing?   [] 1   [] 2   [x] 3   [] 4   5.  Taking care of personal grooming such as brushing teeth?   [] 1   [] 2   [x] 3   [] 4   6.  Eating meals?   [] 1   [] 2   [] 3   [x] 4   © 2007, Trustees of [facility], under license to [employer]. All rights reserved     Score:  Initial: 19 Most Recent: X (Date: -- )   Interpretation of Tool:  Represents activities that are increasingly more difficult (i.e. Bed mobility, Transfers, Gait).    PLAN:   FREQUENCY/DURATION: OT Plan of Care: 3 times/week for duration of hospital stay or until stated goals are met, whichever  comes first.    PROBLEM LIST:   (Skilled intervention is medically necessary to address:)  1. Decreased ADL/Functional Activities  2. Decreased Activity Tolerance  3. Decreased Balance  4. Decreased Strength  5. Decreased Transfer Abilities   INTERVENTIONS PLANNED:   (Benefits and precautions of occupational therapy have been discussed with the patient.)  1. Self Care Training  2. Therapeutic Activity  3. Therapeutic Exercise/HEP  4. Neuromuscular Re-education  5. Education     TREATMENT:     EVALUATION: Moderate Complexity : (Untimed Charge)    TREATMENT:   ($$ Self Care/Home Management: 8-22 mins    )  Self Care (8 Minutes): Self care including Lower Body Dressing and Grooming to increase independence and decrease level of assistance required.     AFTER TREATMENT POSITION/PRECAUTIONS:  Chair, Needs within reach and RN notified    INTERDISCIPLINARY COLLABORATION:  RN/PCT    TOTAL TREATMENT DURATION:  OT Patient Time In/Time Out  Time In: 1019  Time Out: Whitley Clinton

## 2021-01-07 NOTE — ACP (ADVANCE CARE PLANNING)
Advance Care Planning     Advance Care Planning Activator (Inpatient)  Conversation Note      Date of ACP Conversation: 01/07/21     Conversation Conducted with:   Patient with Decision Making Capacity    ACP Activator: Jose Juan Blanca    *When Decision Maker makes decisions on behalf of the incapacitated patient: Decision Maker is asked to consider and make decisions based on patient values, known preferences, or best interests. Health Care Decision Maker:    Current Designated Health Care Decision Maker:   Primary Decision Maker: Duran Manuel - [phone]    Secondary Decision Maker: Faith Cadena - [phone]  (If there is a [address] named in the [address] Makers\" box in the ACP activity, but it is not visible above, be sure to open that field and then select the health care decision maker relationship (ie \"primary\") in the blank space to the right of the name.) Validate  this information as still accurate & up-to-date; edit [employer] field as needed.)    Note: Assess and validate information in current ACP documents, as indicated. If no Decision Maker listed above or available through scanned documents, then:    If no Authorized Decision Maker has previously been identified, then patient chooses [employer]:  \"Who would you like to name as your primary health care decision-maker? \"    Name: Guillermo Davison   Relationship: Son Phone number: [phone]  Valeta Ala this person be reached easily? \" YES  \"Who would you like to name as your back-up decision maker? \"   Name: Meirrosa Gianna  Relationship: Friend Phone number: [phone]/ [phone]  \"Can this person be reached easily? \" YES    Note: If the relationship of these Decision-Makers to the patient does NOT follow your state's Next of Kin hierarchy, recommend that patient complete ACP document that meets state-specific requirements to allow them to act on the patient's behalf when appropriate. Care Preferences    Ventilation: \"If you were in your present state of health and suddenly became very ill and were unable to breathe on your own, what would your preference be about the use of a ventilator (breathing machine) if it were available to you? \"      If patient would desire the use of a ventilator (breathing machine), answer \"yes\", if not \"no\":no    \"If your health worsens and it becomes clear that your chance of recovery is unlikely, what would your preference be about the use of a ventilator (breathing machine) if it were available to you? \"     Would the patient desire the use of a ventilator (breathing machine)? NO      Resuscitation  \"CPR works best to restart the heart when there is a sudden event, like a heart attack, in someone who is otherwise healthy. Unfortunately, CPR does not typically restart the heart for people who have serious health conditions or who are very sick. \"    \"In the event your heart stopped as a result of an underlying serious health condition, would you want attempts to be made to restart your heart (answer \"yes\" for attempt to resuscitate) or would you prefer a natural death (answer \"no\" for do not attempt to resuscitate)? \" yes      NOTE: If the patient has a valid advance directive AND now provides care preference(s) that are inconsistent with that prior directive, advise the patient to consider either: creating a new advance directive that complies with state-specific requirements; or, if that is not possible, orally revoking that prior directive in accordance with state-specific requirements, which must be documented in the EHR. [x] Yes  [] No   Educated Patient / Mary Lou Gan regarding differences between Advance Directives and portable DNR orders.     Length of ACP Conversation in minutes:      Conversation Outcomes:  [x] ACP discussion completed  [] Existing advance directive reviewed with patient; no changes to patient's previously recorded wishes     [] New Advance Directive completed   [] Portable Do Not Resuscitate prepared for Provider review and signature  [] POLST/POST/MOLST/MOST prepared for Provider review and signature      Follow-up plan:    [x] Schedule follow-up conversation to continue planning  [] Referred individual to Provider for additional questions/concerns   [] Advised patient/agent/surrogate to review completed ACP document and update if needed with changes in condition, patient preferences or care setting     [x] This note routed to one or more involved healthcare providers

## 2021-01-07 NOTE — PROGRESS NOTES
01/06/21 2036   Dual Skin Pressure Injury Assessment   Dual Skin Pressure Injury Assessment WDL   Second Care Provider (Based on Facility Policy) Padmini GONZALEZ   Skin Integumentary   Skin Integumentary (WDL) WDL    Pressure  Injury Documentation No Pressure Injury Noted-Pressure Ulcer Prevention Initiated   Skin Color Appropriate for ethnicity   Skin Condition/Temp Dry; Warm   Skin Integrity Intact   Turgor Epidermis thin w/ loss of subcut tissue   Dual skin assessment, patient skin is intact and no signs of skin breakdown, he does have a bruise on left hand.

## 2021-01-08 NOTE — PROGRESS NOTES
Hospitalist Progress Note    2021  Admit Date: 2021  8:10 PM   NAME: Angelena Lombard   :  1940   MRN:  [id number]   Attending: Kadie Dunn MD  PCP:  Chris Alarcon MD    SUBJECTIVE:    79 yo male with PMH of HTN, DM2 evaluated with 10 days of malaise, dyspnea, anorexia. He had positive sick contact though unconfirmed as COVID, 2 weeks prior. He has progressively declined and came to the ED where he has O2 sats in 80s, improved with 5 L NC. CXR shows pulmonary infiltrates. COVID pos on   [facility]     - pt sitting in bedside chair. Fatigued, myalgias and cough.      Review of Systems negative with exception of pertinent positives noted above  PHYSICAL EXAM     Visit Vitals  BP (!) 114/54   Pulse 84   Temp 98.3 °F (36.8 °C)   Resp 19   Wt 70.8 kg (156 lb 1.6 oz)   SpO2 92%   BMI 23.73 kg/m²      Temp (24hrs), Av.9 °F (36.6 °C), Min:97.3 °F (36.3 °C), Max:98.8 °F (37.1 °C)    Oxygen Therapy  O2 Sat (%): 92 % (21 1541)  O2 Device: Nasal cannula (21 1120)  O2 Flow Rate (L/min): 5 l/min (21 1120)    Intake/Output Summary (Last 24 hours) at 2021  Last data filed at 2021 0740  Gross per 24 hour   Intake --   Output 525 ml   Net -525 ml      General: No acute distress    Lungs:  Diminished bilaterally  Heart:  Regular rate and rhythm,  No murmur, rub, or gallop  Abdomen: Soft, Non distended, Non tender, Positive bowel sounds  Extremities: No cyanosis, clubbing or edema  Neurologic:  No focal deficits    ASSESSMENT      Active Hospital Problems    Diagnosis Date Noted    Acute respiratory failure with hypoxia ([facility] [address] 2021    Suspected COVID-19 virus infection 2021    Hypertension 2021    OMAR (obstructive sleep apnea) 10/26/2017    Diabetes mellitus type 2, controlled ([address] 2016    Generalized anxiety disorder 2015     A/p  # covid-19 pneumonia  - Redmesivir D1  - conv plasma   - empiric CAP coverage D2  - Decadron EOT 1/15    - DM2  - SSI, titrate as needed with decadron    # HTN  - lisinopril    # Hyponatremia  - stable at 133    # CKD   - Cr at baseline    DVT Prophylaxis: lovenox    Signed By: Wes Bardales DO     January 7, 2021

## 2021-01-08 NOTE — PROGRESS NOTES
Hourly rounds completed on patient. Patient   O2 sats begin to drop this shift, patient is now on airvo. Patient denies any needs at this time. Will report to oncoming nurse.

## 2021-01-08 NOTE — PROGRESS NOTES
Hourly rounding completed on this shift. No new complaints at this time. All needs met. Pt increase in O2 on airvo. Family updated about pt condition. Pt is currently resting in bed. Will continue to monitor and give report to oncoming nurse.

## 2021-01-08 NOTE — DIABETES MGMT
Patient's blood glucose ranged 231-302 yesterday with patient receiving Humalog 26 units and dexamethasone 6 mg. Blood glucose 171 this morning and 192 at lunch. Lantus 14 units daily was started this morning.

## 2021-01-08 NOTE — PROGRESS NOTES
Spiritual Care Visit, follow up visit. Patient is a retired [employer]  who is known to chaplains in our [address] He was being transported from the Interventional radiology department back to his room, when he saw me returning to the office and called out out to .  responded and asked about his health. He stated that he is not doing very well. Believes he has Covid Pneumonia. Walking by the side of his bed,  prayed for his health and healing. Visit by Candi Mata, Staff .  M.Ed., Th.B., B.A.

## 2021-01-09 NOTE — PROGRESS NOTES
Hospitalist Progress Note    2021  Admit Date: 2021  8:10 PM   NAME: Abigail Hernandez   :  1940   MRN:  [id number]   Attending: Tj Phelps DO  PCP:  Estefany Jensen MD    SUBJECTIVE:    77 yo male with PMH of HTN, DM2 evaluated with 10 days of malaise, dyspnea, anorexia. He had positive sick contact though unconfirmed as COVID, 2 weeks prior. He has progressively declined and came to the ED where he has O2 sats in 80s, improved with 5 L NC. CXR shows pulmonary infiltrates. COVID pos on   Transferred to [facility]     - pt sitting in bedside chair. Fatigued, myalgias and cough.  - o2 requirement increased from % L to airvo 50/45%. Pt visibly fatigued and dyspneic. Spoke with son by phone in the room for update. Review of Systems negative with exception of pertinent positives noted above  PHYSICAL EXAM     Visit Vitals  /66   Pulse (!) 107   Temp 98.5 °F (36.9 °C)   Resp 19   Wt 70.8 kg (156 lb 1.6 oz)   SpO2 94%   BMI 23.73 kg/m²      Temp (24hrs), Av.4 °F (36.9 °C), Min:97.4 °F (36.3 °C), Max:99.4 °F (37.4 °C)    Oxygen Therapy  O2 Sat (%): 94 % (21)  Pulse via Oximetry: 100 beats per minute (21 0559)  O2 Device: Heated; Hi flow nasal cannula (21)  O2 Flow Rate (L/min): 60 l/min (21 160)  O2 Temperature: 87.8 °F (31 °C) (21 160)  FIO2 (%): 90 % (21 160)    Intake/Output Summary (Last 24 hours) at 2021  Last data filed at 2021 1418  Gross per 24 hour   Intake 372.1 ml   Output 650 ml   Net -277.9 ml      General: No acute distress    Lungs:  Diminished bilaterally  Heart:  Regular rate and rhythm,  No murmur, rub, or gallop  Abdomen: Soft, Non distended, Non tender, Positive bowel sounds  Extremities: No cyanosis, clubbing or edema  Neurologic:  No focal deficits    ASSESSMENT      Active Hospital Problems    Diagnosis Date Noted    Acute respiratory failure with hypoxia ([facility] [address] 2021    Suspected COVID-19 virus infection 01/06/2021    Hypertension 01/06/2021    OMAR (obstructive sleep apnea) 10/26/2017    [facility] 06/28/2016    Generalized anxiety disorder 07/09/2015     A/p  # covid-19 pneumonia  - Redmesivir D2  - conv plasma 1/6  - empiric CAP coverage D2  - Decadron EOT 1/15  - wean airvo as tolerated  - CT chest 1/8 neg for PE    - DM2  - SSI, titrate as needed with decadron    # HTN  - lisinopril    # Hyponatremia  - mild, stable    # CKD   - Cr at baseline    DVT Prophylaxis: lovenox    Signed By: Jodee Dias DO     January 8, 2021

## 2021-01-09 NOTE — PROGRESS NOTES
Hospitalist Progress Note    2021  Admit Date: 2021  8:10 PM   NAME: Joaquim Joseph   :  1940   MRN:  [id number]   Attending: Mima Barksdale DO  PCP:  Trudy Jessica MD    SUBJECTIVE:    79 yo male with PMH of HTN, DM2 evaluated with 10 days of malaise, dyspnea, anorexia. He had positive sick contact though unconfirmed as COVID, 2 weeks prior. He has progressively declined and came to the ED where he has O2 sats in 80s, improved with 5 L NC. CXR shows pulmonary infiltrates. COVID pos on   Transferred to [facility]     - pt sitting in bedside chair. Fatigued, myalgias and cough.  - o2 requirement increased from % L to airvo 50/45%. Pt visibly fatigued and dyspneic. Spoke with son by phone in the room for update.  - pt doing worse again today. Now maxed out on Airvo - 60/100%. He is awake and coversant. DNR/DNI. Review of Systems negative with exception of pertinent positives noted above  PHYSICAL EXAM     Visit Vitals  /73 (BP 1 Location: Right arm, BP Patient Position: Supine)   Pulse 87   Temp 97.8 °F (36.6 °C)   Resp 18   Wt 70.8 kg (156 lb 1.6 oz)   SpO2 (!) 87% Comment: RN aware   BMI 23.73 kg/m²      Temp (24hrs), Av °F (36.7 °C), Min:97.5 °F (36.4 °C), Max:98.5 °F (36.9 °C)    Oxygen Therapy  O2 Sat (%): (!) 87 %(RN aware) (21 0801)  Pulse via Oximetry: 100 beats per minute (21 0559)  O2 Device: Heated; Hi flow nasal cannula (21 160)  O2 Flow Rate (L/min): 60 l/min (21 160)  O2 Temperature: 87.8 °F (31 °C) (21 1604)  FIO2 (%): 90 % (21 160)    Intake/Output Summary (Last 24 hours) at 2021 0840  Last data filed at 2021 0507  Gross per 24 hour   Intake 140 ml   Output 1200 ml   Net -1060 ml      General: No acute distress    Lungs:  Diminished bilaterally  Heart:  Regular rate and rhythm,  No murmur, rub, or gallop  Abdomen: Soft, Non distended, Non tender, Positive bowel sounds  Extremities: No cyanosis, clubbing or edema  Neurologic:  No focal deficits    ASSESSMENT      Active Hospital Problems    Diagnosis Date Noted    Acute respiratory failure with hypoxia ([facility] [address] 01/06/2021    Suspected COVID-19 virus infection 01/06/2021    Hypertension 01/06/2021    OMAR (obstructive sleep apnea) 10/26/2017    Diabetes mellitus type 2, controlled ([facility] [address] 06/28/2016    Generalized anxiety disorder 07/09/2015     A/p  # covid-19 pneumonia  - Redmesivir D3  - conv plasma 1/6  - empiric CAP coverage D3  - Decadron EOT 1/15  - wean airvo as tolerated  - CT chest 1/8 neg for PE    - DM2  - SSI, titrate as needed with decadron    # HTN  - lisinopril    # Hyponatremia  - resolved    # CKD   - Cr at baseline    DVT Prophylaxis: lovenox sub q q12    Spoke with son Kirstin Rodriguez by phone, updated on pt condition and poor prognosis.      Signed By: Mason Po, DO     January 9, 2021

## 2021-01-10 PROBLEM — U07.1 PNEUMONIA DUE TO COVID-19 VIRUS: Status: ACTIVE | Noted: 2021-01-01

## 2021-01-10 PROBLEM — J12.82 PNEUMONIA DUE TO COVID-19 VIRUS: Status: ACTIVE | Noted: 2021-01-01

## 2021-01-10 NOTE — CONSULTS
CONSULT NOTE    Rene Pickett    1/10/2021    Date of Admission:  1/6/2021    The patient's chart is reviewed and the patient is discussed with the staff. Subjective:     Patient is a 78 y.o.  male seen and evaluated at the request of Dr. Camille Askew. We were consulted due to worsening ARDs secondary to COVID-19 pneumonia associated with hypoxia. He was admitted on 1/6/2020 to [address] ED with reports of fatigue, weakness, SOB and dry cough. Those symptoms started around 1/1/2021 and stated he was exposed to COVID during the holidays but didn't realize the individual was positive until after exposure. Upon arrival to ED, the patient's saturation was 85-88% on RA. Patient received plasma and remdesvir on 1/7/2021 and was transferred downtown. Over the course of the past several days, his oxygen requirements have continued to increase and his fatigue has worsened. As of 1/9/2021, the patient on maxed out of AIRVO 60L/100% and SPO2 levels in the low 80's. Chart notes he is a DNR/DNI. He has a history of DM, SALAZAR, Reiters syndrome, OMAR. He is  - Former smoker of 40 pack years and quit in 1976. No ACP documents filed. Son is the  during this hospitalization. Patient reports recent diagnosis of OMAR but hasn't been able to find a mask that fits well to use the CPAP machine. Was currently working on this part. The patient is awake, alert, and very pleasant. His HOB is up at 80 degre. On AIRVO at 100%/60L and NRB. SPO2 currently 93-94%. Night time SPO2 levels in the 80's noted. Nursing staff indicated that he had pulled off his NRB and AIRVO through the night. He is not acutely dyspneic and was able to converse with provider. Likely desats with activity. He reports this is day #10 of symptoms. Review of Systems  Review of systems not obtained due to patient factors.     Patient Active Problem List   Diagnosis Code    DM type 2 (diabetes mellitus, type 2) ([facility] [address] E11.9    Pure hypercholesterolemia E78.00    Other malaise and fatigue R53.81, R53.83    Generalized anxiety disorder F41.1    Impotence of organic origin N52.9    Primary hypercoagulable state ([facility] [address] D68.59    Diabetes mellitus type 2, controlled ([facility] [address] E11.9    OMAR (obstructive sleep apnea) G47.33    Nocturnal hypoxemia G47.34    Type 2 diabetes with nephropathy (HCC) E11.21    Acute respiratory failure with hypoxia (HCC) J96.01    Suspected COVID-19 virus infection Z20.822    Hypertension [address] N/A. Prior to Admission Medications   Prescriptions Last Dose Informant Patient Reported? Taking? ONETOUCH VERIO strip 2020 at Unknown time  No Yes   Sig: CHECK BS ONCE DAILY DX E11.9   aspirin 81 mg chewable tablet 2020 at Unknown time  Yes Yes   Sig: Take 81 mg by mouth.   brompheniramine-pseudoeph-DM (BROMFED DM) 2-30-10 mg/5 mL syrup Not Taking at Unknown time  No No   Sig: Take 5 mL by mouth nightly as needed for Congestion, Cough or Rhinitis. clonazePAM (KLONOPIN) 1 mg tablet 2020 at Unknown time  No Yes   Sig: Take 1 Tab by mouth daily as needed (prn anxiety). famotidine (PEPCID) 40 mg tablet 2020 at Unknown time  No Yes   Sig: TAKE 1 TABLET BY MOUTH EVERY DAY   finasteride (PROSCAR) 5 mg tablet 2020 at Unknown time  No Yes   Sig: TAKE 1 TABLET BY MOUTH EVERY DAY   finasteride (PROSCAR) 5 mg tablet Not Taking at Unknown time  No No   Si tab by mouth daily   glipiZIDE SR (GLUCOTROL XL) 5 mg CR tablet 2020 at Unknown time  No Yes   Sig: TAKE 1 TABLET BY MOUTH EVERY DAY   glucose blood VI test strips (ONETOUCH VERIO) strip 2020 at Unknown time  No Yes   Sig: CHECK BS ONCE DAILY DX E11.9   levothyroxine (SYNTHROID) 50 mcg tablet 2020 at Unknown time  No Yes   Sig: TAKE 1 TABLET BY MOUTH EVERY DAY BEFORE BREAKFAST   lisinopril (PRINIVIL, ZESTRIL) 2.5 mg tablet 2020 at Unknown time  No Yes   Sig: Take 1 Tab by mouth daily. metFORMIN (GLUCOPHAGE) 1,000 mg tablet 12/30/2020 at Unknown time  No Yes   Sig: TAKE 1 TABLET BY MOUTH TWICE A DAY WITH MEALS   multivitamin (ONE A DAY) tablet 12/30/2020 at Unknown time  Yes Yes   Sig: Take 1 Tab by mouth daily. Stop seven days prior to surgery per anesthesia protocol. simvastatin (ZOCOR) 40 mg tablet 12/30/2020 at Unknown time  No Yes   Sig: TAKE 1 TABLET BY MOUTH EVERY DAY AT NIGHT   sucralfate (CARAFATE) 100 mg/mL suspension 12/6/2020 at Unknown time  No Yes   Sig: Take 20 mL by mouth every eight (8) hours as needed (heartburn). tamsulosin (FLOMAX) 0.4 mg capsule 12/30/2020 at Unknown time  No Yes   Sig: TAKE 1 CAPSULE BY MOUTH EVERY DAY      Facility-Administered Medications: None       Past Medical History:   Diagnosis Date    Arthritis     hx of Reiters syndrome    Diabetes ([facility] [address] type 2 since 2001    oral agents.  bs: 110-130. hypo at 90    Elevated PSA     Generalized anxiety disorder     Gross hematuria     Impacted cerumen     Impotence of organic origin     Other malaise and fatigue 7/9/2015    Pain in joint, ankle and foot     Primary hypercoagulable state ([facility] [address]     Psychiatric disorder     minimal anxiety and depression     Pure hypercholesterolemia 7/9/2015    Shingles     Sleep apnea     just DX - no C PAP yet     Straining on urination      Past Surgical History:   Procedure Laterality Date    COLONOSCOPY N/A 9/25/2017    COLONOSCOPY / BMI 25 performed by Shukri Patrick MD at [address]  2013    bilateral with iol    HX CATARACT REMOVAL      HX HERNIA REPAIR  bilateral    HX HERNIA REPAIR Right 06/12/2019    HX OTHER SURGICAL Left 1980's    shoulder    HX OTHER SURGICAL Right 1986    knee    HX RETINAL DETACHMENT REPAIR Left     VA COLSC FLX W/RMVL OF TUMOR POLYP LESION SNARE TQ  9/25/2017          Social History     Socioeconomic History    Marital status:      Spouse name: Not on file    Number of children: Not on file    Years of education: Not on file    Highest education level: Not on file   Occupational History    Not on file   Social Needs    Financial resource strain: Not on file    Food insecurity     Worry: Not on file     Inability: Not on file    Transportation needs     Medical: Not on file     Non-medical: Not on file   Tobacco Use    Smoking status: Former Smoker     Packs/day: 2.00     Years: 20.00     Pack years: 40.00    Smokeless tobacco: Never Used    Tobacco comment: quit 1976   Substance and Sexual Activity    Alcohol use:  Yes     Alcohol/week: 6.0 standard drinks     Types: 6 Glasses of wine per week    Drug use: No    Sexual activity: Not on file   Lifestyle    Physical activity     Days per week: Not on file     Minutes per session: Not on file    Stress: Not on file   Relationships    Social connections     Talks on phone: Not on file     Gets together: Not on file     Attends [employer] service: Not on file     Active member of club or organization: Not on file     Attends meetings of clubs or organizations: Not on file     Relationship status: Not on file    Intimate partner violence     Fear of current or ex partner: Not on file     Emotionally abused: Not on file     Physically abused: Not on file     Forced sexual activity: Not on file   Other Topics Concern    Not on file   Social History Narrative    Not on file     Family History   Problem Relation Age of Onset    Cancer Mother     Dementia Mother     Psychiatric Disorder Mother     Cancer Sister         breast    Hypertension Father      Allergies   Allergen Reactions    Penicillin G Rash     As a child       Current Facility-Administered Medications   Medication Dose Route Frequency    dexamethasone (DECADRON) 10 mg/mL injection 6 mg  6 mg IntraVENous Q8H    cholecalciferol (VITAMIN D3) (1000 Units /25 mcg) tablet 6 Tab  6,000 Units Oral DAILY    Followed by   Keila Gordon ON 1/17/2021] cholecalciferol (VITAMIN D3) (1000 Units /25 mcg) tablet 2 Tab  2,000 Units Oral DAILY    albuterol (PROVENTIL HFA, VENTOLIN HFA, PROAIR HFA) inhaler 2 Puff  2 Puff Inhalation Q6H RT    insulin glargine (LANTUS) injection 18 Units  18 Units SubCUTAneous DAILY    0.9% sodium chloride infusion 250 mL  250 mL IntraVENous PRN    remdesivir 100 mg in 0.9% sodium chloride 250 mL IVPB  100 mg IntraVENous Q24H    0.9% sodium chloride infusion for Remdesivir  30 mL IntraVENous Q24H    guaiFENesin-dextromethorphan (ROBITUSSIN DM) 100-10 mg/5 mL syrup 5 mL  5 mL Oral Q6H PRN    sodium chloride (NS) flush 5-40 mL  5-40 mL IntraVENous Q8H    sodium chloride (NS) flush 5-40 mL  5-40 mL IntraVENous PRN    acetaminophen (TYLENOL) tablet 650 mg  650 mg Oral Q6H PRN    Or    acetaminophen (TYLENOL) suppository 650 mg  650 mg Rectal Q6H PRN    polyethylene glycol (MIRALAX) packet 17 g  17 g Oral DAILY PRN    ondansetron (ZOFRAN) injection 4 mg  4 mg IntraVENous Q6H PRN    cefTRIAXone (ROCEPHIN) 2 g in 0.9% sodium chloride (MBP/ADV) 50 mL MBP  2 g IntraVENous Q24H    azithromycin (ZITHROMAX) tablet 500 mg  500 mg Oral Q24H    insulin lispro (HUMALOG) injection 0-10 Units  0-10 Units SubCUTAneous AC&HS    aspirin delayed-release tablet 81 mg  81 mg Oral DAILY    clonazePAM (KlonoPIN) tablet 0.5 mg  0.5 mg Oral DAILY PRN    finasteride (PROSCAR) tablet 5 mg  5 mg Oral DAILY    famotidine (PEPCID) tablet 20 mg  20 mg Oral BID    levothyroxine (SYNTHROID) tablet 50 mcg  50 mcg Oral ACB    [Held by provider] lisinopriL (PRINIVIL, ZESTRIL) tablet 2.5 mg  2.5 mg Oral DAILY    atorvastatin (LIPITOR) tablet 10 mg  10 mg Oral QHS    tamsulosin (FLOMAX) capsule 0.4 mg  0.4 mg Oral DAILY    ascorbic acid (vitamin C) (VITAMIN C) tablet 500 mg  500 mg Oral TID    zinc sulfate tablet 220 mg  220 mg Oral DAILY    enoxaparin (LOVENOX) injection 30 mg  30 mg SubCUTAneous Q12H         Objective:     Vitals:    01/09/21 1936 01/09/21 2307 01/10/21 0220 01/10/21 0806   BP: 120/66 111/61 (!) 145/76 (!) 149/69   Pulse: (!) 104 100 98 (!) 137   Resp: 22 21 21 24   Temp: 98.3 °F (36.8 °C) 98.1 °F (36.7 °C) 97.7 °F (36.5 °C) 99 °F (37.2 °C)   SpO2: 94% (!) 86% (!) 84% (!) 82%   Weight:           PHYSICAL EXAM     Constitutional:  the patient is this and frail. No acute distress noted at this point. EENMT:  Sclera clear, pupils equal, oral mucosa moist  Respiratory: Coarse crackles posterior bilateral lobes  Cardiovascular:  RRR without M,G,R  Gastrointestinal: soft and non-tender; with positive bowel sounds. Musculoskeletal: warm without cyanosis. There is no lower extremity edema. Skin:  no jaundice or rashes, no wounds   Neurologic: no gross neuro deficits     Psychiatric:  alert and oriented x 3    CXR:  1/10/2021 compared to 1/6/2021        CT chest: 1/8/2021        Recent Labs     01/09/21  0546 01/08/21  0420   WBC 9.1 9.3   HGB 12.6* 13.3*   HCT 36.4* 38.9*    244     Recent Labs     01/09/21  0546 01/08/21  0420    134*   K 3.7 3.9    101   * 162*   CO2 20* 25   BUN 34* 42*   CREA 1.11 1.30   CA 8.9 9.1   ALB  --  3.0*   TBILI  --  0.6   ALT  --  108*     No results for input(s): PH, PCO2, PO2, HCO3, PHI, PCO2I, PO2I, HCO3I in the last 72 hours. No results for input(s): LCAD, LAC in the last 72 hours.     Assessment:  (Medical Decision Making)     Hospital Problems  Date Reviewed: 2/25/2020          Codes Class Noted POA    * (Principal) Acute respiratory failure with hypoxia [facility]) ICD-10-CM: J96.01  ICD-9-CM: 518.81  1/6/2021 Yes        Suspected COVID-19 virus infection ICD-10-CM: R05.062  ICD-9-CM: V01.79  1/6/2021 Yes        Hypertension (Chronic) ICD-10-CM: I10  ICD-9-CM: 401.9  1/6/2021 Yes        OMAR (obstructive sleep apnea) ICD-10-CM: L22.79  ICD-9-CM: 327.23  10/26/2017 Yes        Diabetes mellitus type 2, controlled ([facility] ICD-10-CM: E11.9  ICD-9-CM: 250.00  6/28/2016 Yes        Generalized anxiety disorder ICD-10-CM: F41.1  ICD-9-CM: 300.02  7/9/2015 Yes            Pneumonia from COVID 19,     Plan:  (Medical Decision Making)     --AIRVO 100%/60L with NRB - SPO2 while in the room between 93-94% with recheck of 89-91%  --Will place on BiPAP if therapy appropriate - Patient is agreeable to this therapy, though he was avoiding the use of the CPAP therapy due to the mask and his underlying anxiety disorder. He was advised that the BiPAP therapy is typically ineffective for ARDs hypoxia secondary to COVID-19.   --Completed 5 days of Azithromycin/Ceftriaxone  --Day #1 of Dexamethasone 6mg IV (day #10 of sxs)  --Lasix 20mg IV x 1 today ordered  --Vit C & Zinc ongoing  --Received plasma and currently on Day #2 of remdesivir  --Address OMAR and associated therapy with CPAP for use during hospitalization  --MICHELE scheduled with start today.  --GI/DVT prophylaxis   --DNR/DNI noted    More than 50% of the time documented was spent in face-to-face contact with the patient and in the care of the patient on the floor/unit where the patient is located.    Thank you very much for this referral.  We appreciate the opportunity to participate in this patient's care.  Will follow along with above stated plan.    Sloane Ngo NP     Lungs:  Crackles in the bases  Heart:  RRR with no Murmur/Rubs/Gallops    Additional Comments:  Will adjust Opti flow as able since patient does not want BIPAP at this point. His son at bed side and will cont. Decadron and ABX as ordered    I have spoken with and examined the patient. I agree with the above assessment and plan as documented.    Catie Rossi MD

## 2021-01-10 NOTE — ACP (ADVANCE CARE PLANNING)
Time In: 1030  Time Out: 1050  Patient conversation. Nursing staff indicated that the patient stated 'he was tired and didn't want to wear all this equipment'. NP went into the room and had further discussion about this statement/his wishes. He is frustrated that he isn't getting any better and the therapy isn't working. He doesn't want to wear a make for the BiPAP machine. He is very concerned about the ongoing night time as this tends to be his worse times. He stated he is ready to die and is accepting of end of life. However, he does want to talk to his son. Time on call: 1100  Time off call: 36  Phone conversation with [facility]. Contact Ector (son) with an update on his father's condition and status regarding pulmonary. We reviewed the events over the past 2 weeks and more recently within the past 5 days. We talked through the use of BiPAP and masks. The son relayed that Mr. Hazel  would not want to utilize a BiPAP mask. We discussed comfort measures and withdrawing of current interventions. He is preparing to come to the hospital within the next 30 minutes to talk with his dad about his condition and next steps. Anticipate he will transition to comfort measures after the discussions. The son's biggest wishes is to make sure his father does not suffer, smothering, and experience pain. Reviewed the role of comfort measures and how our team will work to ensure his comfort. Our team is available if additional information is needed. Notified Dr. Marimar Fulton of the conversations and next steps.     Terence Davila, PhD, FNP  [facility]

## 2021-01-10 NOTE — PROGRESS NOTES
PT using accessory muscles to breath oxygen saturation between 77%-87% via airvo notified respiratory and MD. New orders received for pt to be placed on non re breather.

## 2021-01-11 NOTE — PROGRESS NOTES
[place] patient coughing again. After looking in on the patient, observed him trying to get out of bed. Appeared to be confused, difficultly following commands. Was able to place him back in bed and reposition. Medicated with IV Ativan. Respirations observed.

## 2021-01-11 NOTE — PROGRESS NOTES
Hospitalist Progress Note    1/10/2021  Admit Date: 2021  8:10 PM   NAME: Kyaw Kunz   :  1940   MRN:  [id number]   Attending: Emma Mccoy DO  PCP:  Dayo Cosme MD    SUBJECTIVE:    79 yo male with PMH of HTN, DM2 evaluated with 10 days of malaise, dyspnea, anorexia. He had positive sick contact though unconfirmed as COVID, 2 weeks prior. He has progressively declined and came to the ED where he has O2 sats in 80s, improved with 5 L NC. CXR shows pulmonary infiltrates.  COVID pos on   Transferred to [facility]     - pt sitting in bedside chair. Fatigued, myalgias and cough.      - o2 requirement increased from % L to airvo 50/45%. Pt visibly fatigued and dyspneic. Spoke with son by phone in the room for update.      - pt doing worse again today. Now maxed out on Airvo - 60/100%. He is awake and coversant. DNR/DNI.     1/10 - condition continues to decline. Spoke with son Ector at bedside as well as patient. They are in understanding. Pt expressing distress this evening and requests comfort care.     Review of Systems negative with exception of pertinent positives noted above  PHYSICAL EXAM     Visit Vitals  /64   Pulse (!) 105   Temp 98.4 °F (36.9 °C)   Resp 18   Wt 70.8 kg (156 lb 1.6 oz)   SpO2 (!) 89%   BMI 23.73 kg/m²      Temp (24hrs), Av.4 °F (36.9 °C), Min:97.7 °F (36.5 °C), Max:99 °F (37.2 °C)    Oxygen Therapy  O2 Sat (%): (!) 89 % (01/10/21 1624)  Pulse via Oximetry: 90 beats per minute (01/10/21 1618)  O2 Device: Hi flow nasal cannula;Non-rebreather mask (01/10/21 1618)  O2 Flow Rate (L/min): (60LPM via air vo and 15LPM via non rebreater ) (01/10/21 1618)  O2 Temperature: 87.8 °F (31 °C) (01/10/21 1020)  FIO2 (%): 100 % (01/10/21 1020)    Intake/Output Summary (Last 24 hours) at 1/10/2021 2033  Last data filed at 1/10/2021 1331  Gross per 24 hour   Intake --   Output 1150 ml   Net -1150 ml      General: No acute distress    Lungs:  Diminished  bilaterally  Heart:  Regular rate and rhythm,  No murmur, rub, or gallop  Abdomen: Soft, Non distended, Non tender, Positive bowel sounds  Extremities: No cyanosis, clubbing or edema  Neurologic:  No focal deficits    ASSESSMENT      Active Hospital Problems    Diagnosis Date Noted    Pneumonia due to COVID-19 virus 01/10/2021    Acute respiratory failure with hypoxia ([facility] [address] 01/06/2021    Suspected COVID-19 virus infection 01/06/2021    Hypertension 01/06/2021    OMAR (obstructive sleep apnea) 10/26/2017    Diabetes mellitus type 2, controlled ([facility] [address] 06/28/2016    Generalized anxiety disorder 07/09/2015     A/p  # covid-19 pneumonia  - Redmesivir - stop  - conv plasma 1/6  - empiric CAP coverage - stop  - Decadron EOT 1/15  - oxygen as needed for comfort  - CT chest 1/8 neg for PE    - DM2  - SSI, titrate as needed with decadron    # HTN  - lisinopril    # Hyponatremia  - resolved    # CKD   - Cr at baseline    DVT Prophylaxis: lovenox sub q q12    Spoke with [facility] at bedside and then RN message night provider - agree with comfort care.  Orders placed    Signed By: Angeles Roy DO     January 10, 2021

## 2021-01-11 NOTE — PROGRESS NOTES
This RN completed hourly rounding on pt & found pt with no chest rise. RN assessed pt & listened for heart & breath sounds but didn't hear any. Carotid artery palpated with no pulse felt. Dr. Tyson Johnson perfect served to come to bedside. Dr. Tyson Johnson assessed pt & called time of death at 33 64 74. Son at bedside. Nursing supervisor notified. Post mortem care completed. Nursing supervisor to notify  home for transportation.

## 2021-01-11 NOTE — PROGRESS NOTES
PT Discharge Note:  Patient is being discharged from PT caseload at this time as he is now comfort measures. Please re-order PT if patient would benefit from our services in the future.   Thank you,  Jeff Anderson, PTA

## 2021-01-11 NOTE — PROGRESS NOTES
Spoke with patient's son, Maritza Dom about wish for father to be receiving comfort measures only before he left. Informed Dr. Guanako Beaver via [employer], received new orders.     - Patient restless asked if he was anxious and he replied yes, medicated with IV Ativan per order.     - Patient's cough increased interfering with rest. Medicated with IV Morphine per order. * Spoke with patient's son Maritza Fernandes [phone] and gave update on patient's confusion. 2245: [place] patient coughing, attempted to medicate with Robitussin. Patient asleep when I entered the room, respirations present. Cancelled entry in [facility]

## 2021-01-11 NOTE — PROGRESS NOTES
Kenia Diaz Fall Documentation      Lauryn Gallegos witnessed/unwitnessed fall occurred on 01/11/21 at [id number]. The answers to the following questions summarize the fall: In the patient's own words,:  · What were you attempting to do when you fell?   - \"going to [employer]\"  · Do you know why you fell?   - \"no\"  · Do you have any pain/discomfort or any other complaints?    - shakes head no  Which part of your body made contact with the floor or other object?  - did not verbalize. Nurse:  Cheikh Michel Was this an assisted fall? No   Was fall witnessed? No    If witnessed, what part of the body made contact with the floor or other object?  Patients mental status after the fall/when found:   - periodic confusion   Any apparent injury: skin tear on right elbowImmediate interventions for injury/suspected injury? Cleaned and dressed    Patient assisted back to bed? Yes   Name of provider notified and time, any comments? Wilder Sue 06:07  Name of family member notified and time: Rosamaria Yan [phone]    Immediate VS and physical assessment documented in flow sheets. - Comfort care only    Neuro assessment every hour x 4 (for potential head injury or unwitnessed fall) documented in flow sheets.       Devora Rivera RN

## 2021-01-11 NOTE — PROGRESS NOTES
Patient's bed alarm rang, patient was found on the floor on laying on his right side. Oxygen was off at the time. Assisted patient back to bed with assistance, placed oxygen back on. Asked patient where he was going and he responded \"going to [employer]\". Reoriented, and informed patient that he can not get up on his own. Bed alarm reset. Shortly after alarm rang again, patient attempting to crawl out of bed. Lap belt in place as well as bed alarm. Frequent rounding planned. Informed Dr. Armond Marquez via [employer], no new orders as patient is comfort care. Asked patient if he was in pain and he shook his head no. Spoke to patient's son Jose Prakash at [phone]. Described fall and patient's condition, son verbalized understanding and will come by later in the morning to be with his father. Patient's son vocalized that he is interested in speaking with the MD about how end of life will be facilitated. Will continue to monitor patient.

## 2021-01-11 NOTE — PROGRESS NOTES
Spiritual Care Visit, initial visit. Visited with patient's son by telephone. Patient is now comfort measures. Prayed for patient and for his son. Visit by Gus Aponte, Staff .  STEFFANY.Trino., J Luis.B., B.A.

## 2021-01-12 NOTE — DISCHARGE SUMMARY
Hospitalist Discharge Summary     Patient ID:  Lorenzo Guillen  [id number]  87 y.o.  1940  Admit date: 1/6/2021  8:10 PM  Discharge date and time: 1/11/2021  Attending: No att. providers found  PCP:  José Miguel Vaca MD  Treatment Team: Utilization Review: Odette Hernández RN; Care Manager: Richie Jordan; Occupational Therapy Assistant: Shankar Eid OTA    Principal Diagnosis Acute respiratory failure with hypoxia ([address]   Principal Problem:    Acute respiratory failure with hypoxia ([address] (1/6/2021)    Active Problems:    Generalized anxiety disorder (7/9/2015)      Diabetes mellitus type 2, controlled ([address] (6/28/2016)      OMAR (obstructive sleep apnea) (10/26/2017)      Suspected COVID-19 virus infection (1/6/2021)      Hypertension (1/6/2021)      Pneumonia due to COVID-19 virus (1/10/2021)             Hospital Course:  Please refer to the admission H&P for details of presentation. In summary, the patient is  77 yo male with PMH of HTN, DM2 evaluated with 10 days of malaise, dyspnea, anorexia. He had positive sick contact though unconfirmed as COVID, 2 weeks prior. He has progressively declined and came to the ED where he has O2 sats in 80s, improved with 5 L NC. CXR shows pulmonary infiltrates. COVID pos on 1/6  Transferred to [facility]     Patient treated with remdesivir, conv plasma, empiric atbx. Mr Skip Chatman oxygen requirement continue to increase and he was not stable despite max setting airvo and NRB. Family very supportive and decided for comfort care. Patient passed at [phone] 1/11/2020. Son was at bedside. May he rest in eternal peace.      Significant Diagnostic Studies:   Final [99] 1/10/2021 09:03 1/10/2021  9:30 AM [id number]  9:30 AM   Study Result    Chest portable     CLINICAL INDICATION: Acutely worsening hypoxia, follow-up of bilateral lung  infiltrates, diabetes     COMPARISON: CT 1/8/2021 and radiograph 1/6/2021     TECHNIQUE: single AP portable view chest at 9:25 AM semiupright      FINDINGS: Lung volumes are normal. Mediastinal and hilar contours are stable. There is no significant change involving multifocal diffuse bilateral mild round  glass infiltrates. No evidence of a dense lobar consolidation or pneumothorax.         IMPRESSION  IMPRESSION: Persisting bilateral lung infiltrates would be compatible with viral  pneumonia.        Final [99] 1/08/2021 11:37 1/08/2021 11:46 AM [id number] 11:46 AM   Study Result    CHEST CT ANGIOGRAPHY WITH ADDITIONAL REFORMATS:       CLINICAL HISTORY:  Shortness of breath and elevated d-dimer an 78year-old with  closed. Clinical concern for pulmonary embolism.     TECHNIQUE:  During bolus injection of nonionic intravenous contrast, the chest  was scanned with spiral technique, and coronal reformats were produced. Radiation dose reduction was achieved using one or all of the following  techniques: automated exposure control, weight-based dosing, iterative  reconstruction.     COMPARISON:  Portable chest of January 6, 2021.     FINDINGS:  There is expected opacification of the pulmonary arterial tree with  no intraluminal soft tissue density to suggest acute pulmonary embolism. There  is no definite pneumothorax. Extensive inhomogeneous bilateral infiltrates with  peripheral predominance are nonspecific but consistent with Covid pneumonia. No  pathologically enlarged lymph nodes or abnormal fluid collection is seen. Gallbladder luminal hyperdensities suggest cholelithiasis. The epigastrium  otherwise appears unremarkable as imaged.     IMPRESSION  IMPRESSION:       1.  COVID PNEUMONIA WITH NO DEFINITE ACUTE PULMONARY EMBOLISM.     2. PROBABLE CHOLELITHIASIS.          Labs: Results:       Chemistry Recent Labs     01/09/21  0546   *      K 3.7      CO2 20*   BUN 34*   CREA 1.11   CA 8.9   AGAP 10      CBC w/Diff Recent Labs     01/09/21  0546   WBC 9.1   RBC 4.00*   HGB 12.6*   HCT 36.4*      GRANS 84*   LYMPH 6*   EOS 0*      Cardiac Enzymes No results for input(s): CPK, CKND1, EMMA in the last 72 hours. No lab exists for component: CKRMB, TROIP   Coagulation No results for input(s): PTP, INR, APTT, INREXT in the last 72 hours. Lipid Panel Lab Results   Component Value Date/Time    Cholesterol, total 143 08/20/2020 11:50 AM    HDL Cholesterol 55 08/20/2020 11:50 AM    LDL, calculated 69 08/20/2020 11:50 AM    VLDL, calculated 19 08/20/2020 11:50 AM    Triglyceride 94 08/20/2020 11:50 AM      BNP No results for input(s): BNPP in the last 72 hours. Liver Enzymes No results for input(s): TP, ALB, TBIL, AP in the last 72 hours. No lab exists for component: SGOT, GPT, DBIL   Thyroid Studies Lab Results   Component Value Date/Time    T4, Total 7.2 01/28/2016 02:59 PM    TSH 2.790 08/20/2020 11:50 AM            Discharge Exam:  Visit Vitals  /61   Pulse (!) 109   Temp 99 °F (37.2 °C)   Resp 20   Wt 70.8 kg (156 lb 1.6 oz)   SpO2 91%   BMI 23.73 kg/m²     Auscultation of heart and lungs w/o spontaneous respirations or heart beat >2 mins. No corneal reflex.        Time spent to discharge patient 35 minutes  Signed:  Chantal Ramirez DO  1/11/2021  7:23 PM

## (undated) DEVICE — KENDALL RADIOLUCENT FOAM MONITORING ELECTRODE RECTANGULAR SHAPE: Brand: KENDALL

## (undated) DEVICE — CANNULA NSL ORAL AD FOR CAPNOFLEX CO2 O2 AIRLFE

## (undated) DEVICE — SYR 3ML LL TIP 1/10ML GRAD --

## (undated) DEVICE — SNARE POLYP SM W13MMXL240CM SHTH DIA2.4MM OVL FLX DISP

## (undated) DEVICE — NDL PRT INJ NSAF BLNT 18GX1.5 --

## (undated) DEVICE — SYR 5ML 1/5 GRAD LL NSAF LF --

## (undated) DEVICE — REM POLYHESIVE ADULT PATIENT RETURN ELECTRODE: Brand: VALLEYLAB

## (undated) DEVICE — CONNECTOR TBNG OD5-7MM O2 END DISP

## (undated) DEVICE — CONTAINER PREFIL FRMLN 40ML --